# Patient Record
Sex: FEMALE | Race: WHITE | NOT HISPANIC OR LATINO | Employment: FULL TIME | URBAN - NONMETROPOLITAN AREA
[De-identification: names, ages, dates, MRNs, and addresses within clinical notes are randomized per-mention and may not be internally consistent; named-entity substitution may affect disease eponyms.]

---
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
## 2023-01-05 ENCOUNTER — APPOINTMENT (OUTPATIENT)
Dept: ULTRASOUND IMAGING | Facility: HOSPITAL | Age: 23
End: 2023-01-05
Payer: COMMERCIAL

## 2023-01-05 ENCOUNTER — HOSPITAL ENCOUNTER (EMERGENCY)
Facility: HOSPITAL | Age: 23
Discharge: HOME | End: 2023-01-05
Attending: STUDENT IN AN ORGANIZED HEALTH CARE EDUCATION/TRAINING PROGRAM | Admitting: STUDENT IN AN ORGANIZED HEALTH CARE EDUCATION/TRAINING PROGRAM
Payer: COMMERCIAL

## 2023-01-05 VITALS
TEMPERATURE: 98.4 F | HEART RATE: 82 BPM | SYSTOLIC BLOOD PRESSURE: 133 MMHG | DIASTOLIC BLOOD PRESSURE: 63 MMHG | HEIGHT: 63 IN | OXYGEN SATURATION: 99 % | WEIGHT: 132 LBS | BODY MASS INDEX: 23.39 KG/M2 | RESPIRATION RATE: 16 BRPM

## 2023-01-05 DIAGNOSIS — N39.0 URINARY TRACT INFECTION WITH HEMATURIA, SITE UNSPECIFIED: Primary | ICD-10-CM

## 2023-01-05 DIAGNOSIS — R31.9 URINARY TRACT INFECTION WITH HEMATURIA, SITE UNSPECIFIED: Primary | ICD-10-CM

## 2023-01-05 LAB
ALBUMIN SERPL-MCNC: 4.4 G/DL (ref 3.5–5.2)
ALBUMIN/GLOB SERPL: 1.5 G/DL
ALP SERPL-CCNC: 43 U/L (ref 39–117)
ALT SERPL W P-5'-P-CCNC: 19 U/L (ref 1–33)
ANION GAP SERPL CALCULATED.3IONS-SCNC: 10 MMOL/L (ref 5–15)
AST SERPL-CCNC: 18 U/L (ref 1–32)
BACTERIA UR QL AUTO: ABNORMAL /HPF
BASOPHILS # BLD AUTO: 0.06 10*3/MM3 (ref 0–0.2)
BASOPHILS NFR BLD AUTO: 0.4 % (ref 0–1.5)
BILIRUB SERPL-MCNC: 0.5 MG/DL (ref 0–1.2)
BILIRUB UR QL STRIP: NEGATIVE
BUN SERPL-MCNC: 9 MG/DL (ref 6–20)
BUN/CREAT SERPL: 12.3 (ref 7–25)
CALCIUM SPEC-SCNC: 9 MG/DL (ref 8.6–10.5)
CANDIDA ALBICANS: NEGATIVE
CHLORIDE SERPL-SCNC: 103 MMOL/L (ref 98–107)
CLARITY UR: ABNORMAL
CO2 SERPL-SCNC: 24 MMOL/L (ref 22–29)
COLOR UR: YELLOW
CREAT SERPL-MCNC: 0.73 MG/DL (ref 0.57–1)
DEPRECATED RDW RBC AUTO: 40.7 FL (ref 37–54)
EGFRCR SERPLBLD CKD-EPI 2021: 120.2 ML/MIN/1.73
EOSINOPHIL # BLD AUTO: 0.63 10*3/MM3 (ref 0–0.4)
EOSINOPHIL NFR BLD AUTO: 4.4 % (ref 0.3–6.2)
ERYTHROCYTE [DISTWIDTH] IN BLOOD BY AUTOMATED COUNT: 12.6 % (ref 12.3–15.4)
GARDNERELLA VAGINALIS: NEGATIVE
GLOBULIN UR ELPH-MCNC: 3 GM/DL
GLUCOSE SERPL-MCNC: 86 MG/DL (ref 65–99)
GLUCOSE UR STRIP-MCNC: NEGATIVE MG/DL
HCG SERPL QL: NEGATIVE
HCT VFR BLD AUTO: 43.5 % (ref 34–46.6)
HGB BLD-MCNC: 14.4 G/DL (ref 12–15.9)
HGB UR QL STRIP.AUTO: ABNORMAL
HOLD SPECIMEN: NORMAL
IMM GRANULOCYTES # BLD AUTO: 0.05 10*3/MM3 (ref 0–0.05)
IMM GRANULOCYTES NFR BLD AUTO: 0.3 % (ref 0–0.5)
KETONES UR QL STRIP: NEGATIVE
LEUKOCYTE ESTERASE UR QL STRIP.AUTO: ABNORMAL
LIPASE SERPL-CCNC: 21 U/L (ref 13–60)
LYMPHOCYTES # BLD AUTO: 2.55 10*3/MM3 (ref 0.7–3.1)
LYMPHOCYTES NFR BLD AUTO: 17.7 % (ref 19.6–45.3)
MCH RBC QN AUTO: 29.1 PG (ref 26.6–33)
MCHC RBC AUTO-ENTMCNC: 33.1 G/DL (ref 31.5–35.7)
MCV RBC AUTO: 88.1 FL (ref 79–97)
MONOCYTES # BLD AUTO: 0.79 10*3/MM3 (ref 0.1–0.9)
MONOCYTES NFR BLD AUTO: 5.5 % (ref 5–12)
NEUTROPHILS NFR BLD AUTO: 10.35 10*3/MM3 (ref 1.7–7)
NEUTROPHILS NFR BLD AUTO: 71.7 % (ref 42.7–76)
NITRITE UR QL STRIP: NEGATIVE
NRBC BLD AUTO-RTO: 0 /100 WBC (ref 0–0.2)
PH UR STRIP.AUTO: 8 [PH] (ref 5–9)
PLATELET # BLD AUTO: 336 10*3/MM3 (ref 140–450)
PMV BLD AUTO: 9.6 FL (ref 6–12)
POTASSIUM SERPL-SCNC: 4.2 MMOL/L (ref 3.5–5.2)
PROT SERPL-MCNC: 7.4 G/DL (ref 6–8.5)
PROT UR QL STRIP: ABNORMAL
RBC # BLD AUTO: 4.94 10*6/MM3 (ref 3.77–5.28)
RBC # UR STRIP: ABNORMAL /HPF
REF LAB TEST METHOD: ABNORMAL
SODIUM SERPL-SCNC: 137 MMOL/L (ref 136–145)
SP GR UR STRIP: 1.01 (ref 1–1.03)
SQUAMOUS #/AREA URNS HPF: ABNORMAL /HPF
T VAGINALIS DNA VAG QL PROBE+SIG AMP: NEGATIVE
UROBILINOGEN UR QL STRIP: ABNORMAL
WBC # UR STRIP: ABNORMAL /HPF
WBC CLUMPS # UR AUTO: ABNORMAL /HPF
WBC NRBC COR # BLD: 14.43 10*3/MM3 (ref 3.4–10.8)
WHOLE BLOOD HOLD COAG: NORMAL
WHOLE BLOOD HOLD SPECIMEN: NORMAL

## 2023-01-05 PROCEDURE — 87660 TRICHOMONAS VAGIN DIR PROBE: CPT | Performed by: STUDENT IN AN ORGANIZED HEALTH CARE EDUCATION/TRAINING PROGRAM

## 2023-01-05 PROCEDURE — 80053 COMPREHEN METABOLIC PANEL: CPT | Performed by: STUDENT IN AN ORGANIZED HEALTH CARE EDUCATION/TRAINING PROGRAM

## 2023-01-05 PROCEDURE — 87086 URINE CULTURE/COLONY COUNT: CPT | Performed by: STUDENT IN AN ORGANIZED HEALTH CARE EDUCATION/TRAINING PROGRAM

## 2023-01-05 PROCEDURE — 87186 SC STD MICRODIL/AGAR DIL: CPT | Performed by: STUDENT IN AN ORGANIZED HEALTH CARE EDUCATION/TRAINING PROGRAM

## 2023-01-05 PROCEDURE — 87480 CANDIDA DNA DIR PROBE: CPT | Performed by: STUDENT IN AN ORGANIZED HEALTH CARE EDUCATION/TRAINING PROGRAM

## 2023-01-05 PROCEDURE — 87088 URINE BACTERIA CULTURE: CPT | Performed by: STUDENT IN AN ORGANIZED HEALTH CARE EDUCATION/TRAINING PROGRAM

## 2023-01-05 PROCEDURE — 76830 TRANSVAGINAL US NON-OB: CPT

## 2023-01-05 PROCEDURE — 85025 COMPLETE CBC W/AUTO DIFF WBC: CPT | Performed by: STUDENT IN AN ORGANIZED HEALTH CARE EDUCATION/TRAINING PROGRAM

## 2023-01-05 PROCEDURE — 84703 CHORIONIC GONADOTROPIN ASSAY: CPT | Performed by: STUDENT IN AN ORGANIZED HEALTH CARE EDUCATION/TRAINING PROGRAM

## 2023-01-05 PROCEDURE — 99284 EMERGENCY DEPT VISIT MOD MDM: CPT

## 2023-01-05 PROCEDURE — 93976 VASCULAR STUDY: CPT

## 2023-01-05 PROCEDURE — 87510 GARDNER VAG DNA DIR PROBE: CPT | Performed by: STUDENT IN AN ORGANIZED HEALTH CARE EDUCATION/TRAINING PROGRAM

## 2023-01-05 PROCEDURE — 83690 ASSAY OF LIPASE: CPT | Performed by: STUDENT IN AN ORGANIZED HEALTH CARE EDUCATION/TRAINING PROGRAM

## 2023-01-05 PROCEDURE — 81001 URINALYSIS AUTO W/SCOPE: CPT | Performed by: STUDENT IN AN ORGANIZED HEALTH CARE EDUCATION/TRAINING PROGRAM

## 2023-01-05 RX ORDER — CEPHALEXIN 500 MG/1
500 CAPSULE ORAL 2 TIMES DAILY
Qty: 10 CAPSULE | Refills: 0 | Status: SHIPPED | OUTPATIENT
Start: 2023-01-05 | End: 2023-01-10 | Stop reason: HOSPADM

## 2023-01-05 RX ORDER — ACETAMINOPHEN 500 MG
1000 TABLET ORAL ONCE
Status: COMPLETED | OUTPATIENT
Start: 2023-01-05 | End: 2023-01-05

## 2023-01-05 RX ORDER — SODIUM CHLORIDE 0.9 % (FLUSH) 0.9 %
10 SYRINGE (ML) INJECTION AS NEEDED
Status: DISCONTINUED | OUTPATIENT
Start: 2023-01-05 | End: 2023-01-05 | Stop reason: HOSPADM

## 2023-01-05 RX ORDER — METRONIDAZOLE 7.5 MG/G
GEL VAGINAL 2 TIMES DAILY
Qty: 70 G | Refills: 0 | Status: SHIPPED | OUTPATIENT
Start: 2023-01-05 | End: 2023-01-10

## 2023-01-05 RX ADMIN — ACETAMINOPHEN 1000 MG: 500 TABLET ORAL at 15:07

## 2023-01-05 NOTE — ED PROVIDER NOTES
Subjective   History of Present Illness  22-year-old female comes to the ER with a several hour history of right-sided lower pelvic pain with some nausea.  She also endorses some burning with urination.  She had intercourse several days ago, but nothing since then.  Denies vaginal bleeding or trauma.    History provided by:  Patient   used: No        Review of Systems   Constitutional: Negative for chills and fever.   HENT: Negative for drooling.    Eyes: Negative for redness.   Respiratory: Negative for shortness of breath.    Cardiovascular: Negative for chest pain.   Gastrointestinal: Positive for abdominal pain and nausea. Negative for vomiting.   Genitourinary: Positive for dysuria. Negative for flank pain, hematuria, pelvic pain, urgency, vaginal bleeding and vaginal discharge.   Skin: Negative for color change.   Neurological: Negative for seizures.   Psychiatric/Behavioral: Negative for confusion.       Past Medical History:   Diagnosis Date   • Tobacco abuse        No Known Allergies    Past Surgical History:   Procedure Laterality Date   • NO PAST SURGERIES         Family History   Problem Relation Age of Onset   • Hyperlipidemia Paternal Grandfather    • Hypertension Maternal Grandmother        Social History     Socioeconomic History   • Marital status: Single   Tobacco Use   • Smoking status: Some Days     Packs/day: 0.25     Years: 1.00     Pack years: 0.25     Types: Electronic Cigarette, Cigarettes   • Smokeless tobacco: Former   Substance and Sexual Activity   • Alcohol use: No   • Drug use: No   • Sexual activity: Defer           Objective    Vitals:    01/05/23 1234 01/05/23 1345 01/05/23 1400 01/05/23 1554   BP: 112/76 106/55 119/59 133/63   BP Location: Right arm      Patient Position: Sitting      Pulse: 91  89 82   Resp: 16  16 16   Temp: 98.4 °F (36.9 °C)      TempSrc: Oral      SpO2: 99%  98% 99%   Weight: 59.9 kg (132 lb)      Height: 160 cm (63\")          Physical  Exam  Vitals and nursing note reviewed. Exam conducted with a chaperone present.   Constitutional:       General: She is not in acute distress.     Appearance: She is well-developed. She is not ill-appearing or diaphoretic.   HENT:      Head: Normocephalic.      Right Ear: External ear normal.      Left Ear: External ear normal.   Eyes:      Conjunctiva/sclera: Conjunctivae normal.   Pulmonary:      Effort: Pulmonary effort is normal. No accessory muscle usage or respiratory distress.   Abdominal:      Tenderness: There is abdominal tenderness. There is no right CVA tenderness, left CVA tenderness, guarding or rebound.   Genitourinary:     General: Normal vulva.      Exam position: Supine.      Vagina: No signs of injury and foreign body. Vaginal discharge present. No erythema, tenderness or bleeding.      Cervix: Normal.   Skin:     General: Skin is warm and dry.      Capillary Refill: Capillary refill takes less than 2 seconds.   Neurological:      Mental Status: She is oriented to person, place, and time.   Psychiatric:         Behavior: Behavior normal.         Procedures           ED Course      Results for orders placed or performed during the hospital encounter of 01/05/23   Gardnerella vaginalis, Trichomonas vaginalis, Candida albicans, DNA - Swab, Vagina    Specimen: Vagina; Swab   Result Value Ref Range    CANDIDA ALBICANS Negative Negative    GARDNERELLA VAGINALIS Negative Negative    TRICHOMONAS VAGINALIS Negative Negative   Comprehensive Metabolic Panel    Specimen: Blood   Result Value Ref Range    Glucose 86 65 - 99 mg/dL    BUN 9 6 - 20 mg/dL    Creatinine 0.73 0.57 - 1.00 mg/dL    Sodium 137 136 - 145 mmol/L    Potassium 4.2 3.5 - 5.2 mmol/L    Chloride 103 98 - 107 mmol/L    CO2 24.0 22.0 - 29.0 mmol/L    Calcium 9.0 8.6 - 10.5 mg/dL    Total Protein 7.4 6.0 - 8.5 g/dL    Albumin 4.4 3.5 - 5.2 g/dL    ALT (SGPT) 19 1 - 33 U/L    AST (SGOT) 18 1 - 32 U/L    Alkaline Phosphatase 43 39 - 117 U/L     Total Bilirubin 0.5 0.0 - 1.2 mg/dL    Globulin 3.0 gm/dL    A/G Ratio 1.5 g/dL    BUN/Creatinine Ratio 12.3 7.0 - 25.0    Anion Gap 10.0 5.0 - 15.0 mmol/L    eGFR 120.2 >60.0 mL/min/1.73   Lipase    Specimen: Blood   Result Value Ref Range    Lipase 21 13 - 60 U/L   CBC Auto Differential    Specimen: Blood   Result Value Ref Range    WBC 14.43 (H) 3.40 - 10.80 10*3/mm3    RBC 4.94 3.77 - 5.28 10*6/mm3    Hemoglobin 14.4 12.0 - 15.9 g/dL    Hematocrit 43.5 34.0 - 46.6 %    MCV 88.1 79.0 - 97.0 fL    MCH 29.1 26.6 - 33.0 pg    MCHC 33.1 31.5 - 35.7 g/dL    RDW 12.6 12.3 - 15.4 %    RDW-SD 40.7 37.0 - 54.0 fl    MPV 9.6 6.0 - 12.0 fL    Platelets 336 140 - 450 10*3/mm3    Neutrophil % 71.7 42.7 - 76.0 %    Lymphocyte % 17.7 (L) 19.6 - 45.3 %    Monocyte % 5.5 5.0 - 12.0 %    Eosinophil % 4.4 0.3 - 6.2 %    Basophil % 0.4 0.0 - 1.5 %    Immature Grans % 0.3 0.0 - 0.5 %    Neutrophils, Absolute 10.35 (H) 1.70 - 7.00 10*3/mm3    Lymphocytes, Absolute 2.55 0.70 - 3.10 10*3/mm3    Monocytes, Absolute 0.79 0.10 - 0.90 10*3/mm3    Eosinophils, Absolute 0.63 (H) 0.00 - 0.40 10*3/mm3    Basophils, Absolute 0.06 0.00 - 0.20 10*3/mm3    Immature Grans, Absolute 0.05 0.00 - 0.05 10*3/mm3    nRBC 0.0 0.0 - 0.2 /100 WBC   hCG, Serum, Qualitative    Specimen: Blood   Result Value Ref Range    HCG Qualitative Negative Negative   Urinalysis With Culture If Indicated - Urine, Clean Catch    Specimen: Urine, Clean Catch   Result Value Ref Range    Color, UA Yellow Yellow, Straw, Dark Yellow, Shannan    Appearance, UA Cloudy (A) Clear    pH, UA 8.0 5.0 - 9.0    Specific [place], UA 1.011 1.003 - 1.030    Glucose, UA Negative Negative    Ketones, UA Negative Negative    Bilirubin, UA Negative Negative    Blood, UA Small (1+) (A) Negative    Protein, UA 30 mg/dL (1+) (A) Negative    Leuk Esterase, UA Large (3+) (A) Negative    Nitrite, UA Negative Negative    Urobilinogen, UA 0.2 E.U./dL 0.2 - 1.0 E.U./dL   Urinalysis, Microscopic Only -  Urine, Clean Catch    Specimen: Urine, Clean Catch   Result Value Ref Range    RBC, UA 3-5 (A) None Seen /HPF    WBC, UA Too Numerous to Count (A) None Seen, 0-2, 3-5 /HPF    Bacteria, UA Trace (A) None Seen /HPF    Squamous Epithelial Cells, UA 0-2 None Seen, 0-2 /HPF    WBC Clumps, UA Moderate/2+ None Seen /HPF    Methodology Manual Light Microscopy    Green Top (Gel)   Result Value Ref Range    Extra Tube Hold for add-ons.    Lavender Top   Result Value Ref Range    Extra Tube hold for add-on    Light Blue Top   Result Value Ref Range    Extra Tube Hold for add-ons.                                         Medical Decision Making  Vital signs are stable, afebrile.  Labs obtained significant for 3+ leukocytes.  White count is normal.  Vaginalis panel is negative.  Ultrasound shows no acute findings.  Good blood flow bilaterally.  Antibiotics called into her pharmacy.  Return precautions given.  Recommend PCP follow-up.  Patient states understanding and is agreeable to the plan.    Urinary tract infection with hematuria, site unspecified: acute illness or injury  Amount and/or Complexity of Data Reviewed  Labs: ordered. Decision-making details documented in ED Course.  Radiology: ordered. Decision-making details documented in ED Course.  ECG/medicine tests: ordered.      Risk  OTC drugs.  Prescription drug management.          Final diagnoses:   Urinary tract infection with hematuria, site unspecified       ED Disposition  ED Disposition     ED Disposition   Discharge    Condition   Stable    Comment   --             Vijaya Guzman MD  [address] [place], [place] [id number] [phone]    Schedule an appointment as soon as possible for a visit in 2 days           Medication List      New Prescriptions    cephalexin 500 MG capsule  Commonly known as: KEFLEX  Take 1 capsule by mouth 2 (Two) Times a Day for 5 days.     metroNIDAZOLE 0.75 % vaginal gel  Commonly known as: METROGEL  Insert  into the vagina 2 (Two)  Times a Day for 5 days.           Where to Get Your Medications      These medications were sent to [id number] - [facility] [address] AT [facility] [address] - [phone]  - [phone]   [address] [address]    Phone: [phone]   · cephalexin 500 MG capsule  · metroNIDAZOLE 0.75 % vaginal gel          Martin Reynaga MD  01/05/23 9492

## 2023-01-06 ENCOUNTER — HOSPITAL ENCOUNTER (INPATIENT)
Facility: HOSPITAL | Age: 23
LOS: 3 days | Discharge: HOME | End: 2023-01-10
Attending: EMERGENCY MEDICINE | Admitting: HOSPITALIST
Payer: COMMERCIAL

## 2023-01-06 ENCOUNTER — APPOINTMENT (OUTPATIENT)
Dept: CT IMAGING | Facility: HOSPITAL | Age: 23
End: 2023-01-06
Payer: COMMERCIAL

## 2023-01-06 DIAGNOSIS — N12 PYELONEPHRITIS: ICD-10-CM

## 2023-01-06 DIAGNOSIS — A41.9 SEPSIS WITHOUT ACUTE ORGAN DYSFUNCTION, DUE TO UNSPECIFIED ORGANISM: Primary | ICD-10-CM

## 2023-01-06 DIAGNOSIS — N20.1 URETEROLITHIASIS: ICD-10-CM

## 2023-01-06 LAB
ALBUMIN SERPL-MCNC: 4.2 G/DL (ref 3.5–5.2)
ALBUMIN/GLOB SERPL: 1.2 G/DL
ALP SERPL-CCNC: 48 U/L (ref 39–117)
ALT SERPL W P-5'-P-CCNC: 23 U/L (ref 1–33)
ANION GAP SERPL CALCULATED.3IONS-SCNC: 14 MMOL/L (ref 5–15)
AST SERPL-CCNC: 19 U/L (ref 1–32)
BACTERIA UR QL AUTO: ABNORMAL /HPF
BASOPHILS # BLD AUTO: 0.06 10*3/MM3 (ref 0–0.2)
BASOPHILS NFR BLD AUTO: 0.4 % (ref 0–1.5)
BILIRUB SERPL-MCNC: 0.5 MG/DL (ref 0–1.2)
BILIRUB UR QL STRIP: NEGATIVE
BUN SERPL-MCNC: 11 MG/DL (ref 6–20)
BUN/CREAT SERPL: 12.1 (ref 7–25)
CALCIUM SPEC-SCNC: 9 MG/DL (ref 8.6–10.5)
CHLORIDE SERPL-SCNC: 96 MMOL/L (ref 98–107)
CLARITY UR: CLEAR
CO2 SERPL-SCNC: 22 MMOL/L (ref 22–29)
COLOR UR: YELLOW
CREAT SERPL-MCNC: 0.91 MG/DL (ref 0.57–1)
D-LACTATE SERPL-SCNC: 0.9 MMOL/L (ref 0.5–2)
DEPRECATED RDW RBC AUTO: 38.7 FL (ref 37–54)
EGFRCR SERPLBLD CKD-EPI 2021: 92.2 ML/MIN/1.73
EOSINOPHIL # BLD AUTO: 0.01 10*3/MM3 (ref 0–0.4)
EOSINOPHIL NFR BLD AUTO: 0.1 % (ref 0.3–6.2)
ERYTHROCYTE [DISTWIDTH] IN BLOOD BY AUTOMATED COUNT: 12.6 % (ref 12.3–15.4)
GLOBULIN UR ELPH-MCNC: 3.5 GM/DL
GLUCOSE SERPL-MCNC: 96 MG/DL (ref 65–99)
GLUCOSE UR STRIP-MCNC: NEGATIVE MG/DL
HCG SERPL QL: NEGATIVE
HCT VFR BLD AUTO: 39.5 % (ref 34–46.6)
HGB BLD-MCNC: 13.9 G/DL (ref 12–15.9)
HGB UR QL STRIP.AUTO: ABNORMAL
HOLD SPECIMEN: NORMAL
HYALINE CASTS UR QL AUTO: ABNORMAL /LPF
IMM GRANULOCYTES # BLD AUTO: 0.04 10*3/MM3 (ref 0–0.05)
IMM GRANULOCYTES NFR BLD AUTO: 0.3 % (ref 0–0.5)
KETONES UR QL STRIP: ABNORMAL
LEUKOCYTE ESTERASE UR QL STRIP.AUTO: ABNORMAL
LYMPHOCYTES # BLD AUTO: 1.15 10*3/MM3 (ref 0.7–3.1)
LYMPHOCYTES NFR BLD AUTO: 7.9 % (ref 19.6–45.3)
MAGNESIUM SERPL-MCNC: 1.8 MG/DL (ref 1.6–2.6)
MCH RBC QN AUTO: 30.1 PG (ref 26.6–33)
MCHC RBC AUTO-ENTMCNC: 35.2 G/DL (ref 31.5–35.7)
MCV RBC AUTO: 85.5 FL (ref 79–97)
MONOCYTES # BLD AUTO: 0.97 10*3/MM3 (ref 0.1–0.9)
MONOCYTES NFR BLD AUTO: 6.6 % (ref 5–12)
NEUTROPHILS NFR BLD AUTO: 12.36 10*3/MM3 (ref 1.7–7)
NEUTROPHILS NFR BLD AUTO: 84.7 % (ref 42.7–76)
NITRITE UR QL STRIP: NEGATIVE
NRBC BLD AUTO-RTO: 0 /100 WBC (ref 0–0.2)
PH UR STRIP.AUTO: 6 [PH] (ref 5–9)
PLATELET # BLD AUTO: 274 10*3/MM3 (ref 140–450)
PMV BLD AUTO: 9.5 FL (ref 6–12)
POTASSIUM SERPL-SCNC: 3.8 MMOL/L (ref 3.5–5.2)
PROT SERPL-MCNC: 7.7 G/DL (ref 6–8.5)
PROT UR QL STRIP: NEGATIVE
RBC # BLD AUTO: 4.62 10*6/MM3 (ref 3.77–5.28)
RBC # UR STRIP: ABNORMAL /HPF
REF LAB TEST METHOD: ABNORMAL
SODIUM SERPL-SCNC: 132 MMOL/L (ref 136–145)
SP GR UR STRIP: 1 (ref 1–1.03)
SQUAMOUS #/AREA URNS HPF: ABNORMAL /HPF
UROBILINOGEN UR QL STRIP: ABNORMAL
WBC # UR STRIP: ABNORMAL /HPF
WBC NRBC COR # BLD: 14.59 10*3/MM3 (ref 3.4–10.8)
WHOLE BLOOD HOLD COAG: NORMAL

## 2023-01-06 PROCEDURE — 87040 BLOOD CULTURE FOR BACTERIA: CPT | Performed by: EMERGENCY MEDICINE

## 2023-01-06 PROCEDURE — 93010 ELECTROCARDIOGRAM REPORT: CPT | Performed by: INTERNAL MEDICINE

## 2023-01-06 PROCEDURE — 83605 ASSAY OF LACTIC ACID: CPT | Performed by: EMERGENCY MEDICINE

## 2023-01-06 PROCEDURE — 87086 URINE CULTURE/COLONY COUNT: CPT | Performed by: INTERNAL MEDICINE

## 2023-01-06 PROCEDURE — 25010000002 ONDANSETRON PER 1 MG: Performed by: EMERGENCY MEDICINE

## 2023-01-06 PROCEDURE — 87491 CHLMYD TRACH DNA AMP PROBE: CPT | Performed by: EMERGENCY MEDICINE

## 2023-01-06 PROCEDURE — 25010000002 CEFTRIAXONE PER 250 MG: Performed by: EMERGENCY MEDICINE

## 2023-01-06 PROCEDURE — 87591 N.GONORRHOEAE DNA AMP PROB: CPT | Performed by: EMERGENCY MEDICINE

## 2023-01-06 PROCEDURE — 25010000002 KETOROLAC TROMETHAMINE PER 15 MG: Performed by: EMERGENCY MEDICINE

## 2023-01-06 PROCEDURE — 99285 EMERGENCY DEPT VISIT HI MDM: CPT

## 2023-01-06 PROCEDURE — 93005 ELECTROCARDIOGRAM TRACING: CPT | Performed by: EMERGENCY MEDICINE

## 2023-01-06 PROCEDURE — 85025 COMPLETE CBC W/AUTO DIFF WBC: CPT | Performed by: EMERGENCY MEDICINE

## 2023-01-06 PROCEDURE — 87661 TRICHOMONAS VAGINALIS AMPLIF: CPT | Performed by: EMERGENCY MEDICINE

## 2023-01-06 PROCEDURE — 83735 ASSAY OF MAGNESIUM: CPT | Performed by: EMERGENCY MEDICINE

## 2023-01-06 PROCEDURE — 93005 ELECTROCARDIOGRAM TRACING: CPT

## 2023-01-06 PROCEDURE — 84703 CHORIONIC GONADOTROPIN ASSAY: CPT | Performed by: EMERGENCY MEDICINE

## 2023-01-06 PROCEDURE — 74176 CT ABD & PELVIS W/O CONTRAST: CPT

## 2023-01-06 PROCEDURE — 81001 URINALYSIS AUTO W/SCOPE: CPT | Performed by: EMERGENCY MEDICINE

## 2023-01-06 PROCEDURE — 80053 COMPREHEN METABOLIC PANEL: CPT | Performed by: EMERGENCY MEDICINE

## 2023-01-06 RX ORDER — ONDANSETRON 2 MG/ML
4 INJECTION INTRAMUSCULAR; INTRAVENOUS ONCE
Status: COMPLETED | OUTPATIENT
Start: 2023-01-06 | End: 2023-01-06

## 2023-01-06 RX ORDER — SODIUM CHLORIDE 0.9 % (FLUSH) 0.9 %
10 SYRINGE (ML) INJECTION AS NEEDED
Status: DISCONTINUED | OUTPATIENT
Start: 2023-01-06 | End: 2023-01-10 | Stop reason: HOSPADM

## 2023-01-06 RX ORDER — KETOROLAC TROMETHAMINE 30 MG/ML
30 INJECTION, SOLUTION INTRAMUSCULAR; INTRAVENOUS ONCE
Status: COMPLETED | OUTPATIENT
Start: 2023-01-06 | End: 2023-01-06

## 2023-01-06 RX ADMIN — SODIUM CHLORIDE 1000 ML: 9 INJECTION, SOLUTION INTRAVENOUS at 20:44

## 2023-01-06 RX ADMIN — CEFTRIAXONE SODIUM 1 G: 1 INJECTION, POWDER, FOR SOLUTION INTRAMUSCULAR; INTRAVENOUS at 21:45

## 2023-01-06 RX ADMIN — KETOROLAC TROMETHAMINE 30 MG: 30 INJECTION, SOLUTION INTRAMUSCULAR; INTRAVENOUS at 20:44

## 2023-01-06 RX ADMIN — ONDANSETRON 4 MG: 2 INJECTION INTRAMUSCULAR; INTRAVENOUS at 20:45

## 2023-01-07 ENCOUNTER — ANESTHESIA EVENT (OUTPATIENT)
Dept: PERIOP | Facility: HOSPITAL | Age: 23
End: 2023-01-07
Payer: COMMERCIAL

## 2023-01-07 ENCOUNTER — APPOINTMENT (OUTPATIENT)
Dept: GENERAL RADIOLOGY | Facility: HOSPITAL | Age: 23
End: 2023-01-07
Payer: COMMERCIAL

## 2023-01-07 ENCOUNTER — ANESTHESIA (OUTPATIENT)
Dept: PERIOP | Facility: HOSPITAL | Age: 23
End: 2023-01-07
Payer: COMMERCIAL

## 2023-01-07 PROBLEM — N20.1 URETEROLITHIASIS: Status: ACTIVE | Noted: 2023-01-06

## 2023-01-07 PROBLEM — A41.9 SEPSIS WITHOUT ACUTE ORGAN DYSFUNCTION, DUE TO UNSPECIFIED ORGANISM: Status: ACTIVE | Noted: 2023-01-07

## 2023-01-07 PROBLEM — A41.9 SEPSIS: Status: ACTIVE | Noted: 2023-01-07

## 2023-01-07 LAB
ANION GAP SERPL CALCULATED.3IONS-SCNC: 10 MMOL/L (ref 5–15)
BACTERIA SPEC AEROBE CULT: ABNORMAL
BUN SERPL-MCNC: 11 MG/DL (ref 6–20)
BUN/CREAT SERPL: 13.8 (ref 7–25)
C TRACH RRNA CVX QL NAA+PROBE: NEGATIVE
CALCIUM SPEC-SCNC: 7.3 MG/DL (ref 8.6–10.5)
CHLORIDE SERPL-SCNC: 105 MMOL/L (ref 98–107)
CO2 SERPL-SCNC: 20 MMOL/L (ref 22–29)
CREAT SERPL-MCNC: 0.8 MG/DL (ref 0.57–1)
DEPRECATED RDW RBC AUTO: 40.4 FL (ref 37–54)
EGFRCR SERPLBLD CKD-EPI 2021: 107.7 ML/MIN/1.73
ERYTHROCYTE [DISTWIDTH] IN BLOOD BY AUTOMATED COUNT: 12.4 % (ref 12.3–15.4)
GLUCOSE SERPL-MCNC: 95 MG/DL (ref 65–99)
HBA1C MFR BLD: 4.8 % (ref 4.8–5.6)
HCT VFR BLD AUTO: 35.7 % (ref 34–46.6)
HGB BLD-MCNC: 11.9 G/DL (ref 12–15.9)
MAGNESIUM SERPL-MCNC: 1.7 MG/DL (ref 1.6–2.6)
MCH RBC QN AUTO: 29.7 PG (ref 26.6–33)
MCHC RBC AUTO-ENTMCNC: 33.3 G/DL (ref 31.5–35.7)
MCV RBC AUTO: 89 FL (ref 79–97)
N GONORRHOEA RRNA SPEC QL NAA+PROBE: NEGATIVE
PLATELET # BLD AUTO: 238 10*3/MM3 (ref 140–450)
PMV BLD AUTO: 9.6 FL (ref 6–12)
POTASSIUM SERPL-SCNC: 4 MMOL/L (ref 3.5–5.2)
QT INTERVAL: 256 MS
QTC INTERVAL: 395 MS
RBC # BLD AUTO: 4.01 10*6/MM3 (ref 3.77–5.28)
SODIUM SERPL-SCNC: 135 MMOL/L (ref 136–145)
TRICHOMONAS VAGINALIS PCR: NEGATIVE
TSH SERPL DL<=0.05 MIU/L-ACNC: 1.32 UIU/ML (ref 0.27–4.2)
WBC NRBC COR # BLD: 15.71 10*3/MM3 (ref 3.4–10.8)

## 2023-01-07 PROCEDURE — 25010000002 ONDANSETRON PER 1 MG: Performed by: INTERNAL MEDICINE

## 2023-01-07 PROCEDURE — 83036 HEMOGLOBIN GLYCOSYLATED A1C: CPT | Performed by: INTERNAL MEDICINE

## 2023-01-07 PROCEDURE — C1769 GUIDE WIRE: HCPCS | Performed by: UROLOGY

## 2023-01-07 PROCEDURE — 25010000002 CEFAZOLIN PER 500 MG: Performed by: NURSE ANESTHETIST, CERTIFIED REGISTERED

## 2023-01-07 PROCEDURE — 25010000002 FENTANYL CITRATE (PF) 50 MCG/ML SOLUTION: Performed by: NURSE ANESTHETIST, CERTIFIED REGISTERED

## 2023-01-07 PROCEDURE — 25010000002 MORPHINE PER 10 MG: Performed by: INTERNAL MEDICINE

## 2023-01-07 PROCEDURE — 85027 COMPLETE CBC AUTOMATED: CPT | Performed by: INTERNAL MEDICINE

## 2023-01-07 PROCEDURE — 25010000002 IOPAMIDOL 61 % SOLUTION: Performed by: UROLOGY

## 2023-01-07 PROCEDURE — 83735 ASSAY OF MAGNESIUM: CPT | Performed by: INTERNAL MEDICINE

## 2023-01-07 PROCEDURE — C1758 CATHETER, URETERAL: HCPCS | Performed by: UROLOGY

## 2023-01-07 PROCEDURE — 80048 BASIC METABOLIC PNL TOTAL CA: CPT | Performed by: INTERNAL MEDICINE

## 2023-01-07 PROCEDURE — 25010000002 PIPERACILLIN SOD-TAZOBACTAM PER 1 G: Performed by: INTERNAL MEDICINE

## 2023-01-07 PROCEDURE — 84443 ASSAY THYROID STIM HORMONE: CPT | Performed by: INTERNAL MEDICINE

## 2023-01-07 PROCEDURE — 0T768DZ DILATION OF RIGHT URETER WITH INTRALUMINAL DEVICE, VIA NATURAL OR ARTIFICIAL OPENING ENDOSCOPIC: ICD-10-PCS | Performed by: UROLOGY

## 2023-01-07 PROCEDURE — 25010000002 MIDAZOLAM PER 1 MG: Performed by: NURSE ANESTHETIST, CERTIFIED REGISTERED

## 2023-01-07 PROCEDURE — C1894 INTRO/SHEATH, NON-LASER: HCPCS | Performed by: UROLOGY

## 2023-01-07 PROCEDURE — C2617 STENT, NON-COR, TEM W/O DEL: HCPCS | Performed by: UROLOGY

## 2023-01-07 PROCEDURE — 25010000002 PROPOFOL 10 MG/ML EMULSION: Performed by: NURSE ANESTHETIST, CERTIFIED REGISTERED

## 2023-01-07 PROCEDURE — 74420 UROGRAPHY RTRGR +-KUB: CPT

## 2023-01-07 DEVICE — URETERAL STENT
Type: IMPLANTABLE DEVICE | Site: URETER | Status: FUNCTIONAL
Brand: POLARIS™ ULTRA

## 2023-01-07 RX ORDER — CEFAZOLIN SODIUM 1 G/3ML
INJECTION, POWDER, FOR SOLUTION INTRAMUSCULAR; INTRAVENOUS AS NEEDED
Status: DISCONTINUED | OUTPATIENT
Start: 2023-01-07 | End: 2023-01-07 | Stop reason: SURG

## 2023-01-07 RX ORDER — MORPHINE SULFATE 2 MG/ML
2 INJECTION, SOLUTION INTRAMUSCULAR; INTRAVENOUS EVERY 4 HOURS PRN
Status: DISCONTINUED | OUTPATIENT
Start: 2023-01-07 | End: 2023-01-07

## 2023-01-07 RX ORDER — FENTANYL CITRATE 50 UG/ML
INJECTION, SOLUTION INTRAMUSCULAR; INTRAVENOUS AS NEEDED
Status: DISCONTINUED | OUTPATIENT
Start: 2023-01-07 | End: 2023-01-07 | Stop reason: SURG

## 2023-01-07 RX ORDER — PROPOFOL 10 MG/ML
VIAL (ML) INTRAVENOUS AS NEEDED
Status: DISCONTINUED | OUTPATIENT
Start: 2023-01-07 | End: 2023-01-07 | Stop reason: SURG

## 2023-01-07 RX ORDER — ONDANSETRON 4 MG/1
4 TABLET, FILM COATED ORAL EVERY 6 HOURS PRN
Status: DISCONTINUED | OUTPATIENT
Start: 2023-01-07 | End: 2023-01-10 | Stop reason: HOSPADM

## 2023-01-07 RX ORDER — KETOROLAC TROMETHAMINE 15 MG/ML
15 INJECTION, SOLUTION INTRAMUSCULAR; INTRAVENOUS EVERY 6 HOURS PRN
Status: DISCONTINUED | OUTPATIENT
Start: 2023-01-07 | End: 2023-01-07

## 2023-01-07 RX ORDER — TAMSULOSIN HYDROCHLORIDE 0.4 MG/1
0.4 CAPSULE ORAL DAILY
Status: DISCONTINUED | OUTPATIENT
Start: 2023-01-08 | End: 2023-01-10 | Stop reason: HOSPADM

## 2023-01-07 RX ORDER — MIDAZOLAM HYDROCHLORIDE 1 MG/ML
INJECTION INTRAMUSCULAR; INTRAVENOUS AS NEEDED
Status: DISCONTINUED | OUTPATIENT
Start: 2023-01-07 | End: 2023-01-07 | Stop reason: SURG

## 2023-01-07 RX ORDER — ACETAMINOPHEN 325 MG/1
650 TABLET ORAL EVERY 6 HOURS PRN
Status: DISCONTINUED | OUTPATIENT
Start: 2023-01-07 | End: 2023-01-10 | Stop reason: HOSPADM

## 2023-01-07 RX ORDER — ACETAMINOPHEN 325 MG/1
650 TABLET ORAL EVERY 6 HOURS PRN
Status: DISCONTINUED | OUTPATIENT
Start: 2023-01-07 | End: 2023-01-07

## 2023-01-07 RX ORDER — DEXTROSE AND SODIUM CHLORIDE 5; .45 G/100ML; G/100ML
100 INJECTION, SOLUTION INTRAVENOUS CONTINUOUS
Status: DISCONTINUED | OUTPATIENT
Start: 2023-01-07 | End: 2023-01-09

## 2023-01-07 RX ORDER — HYDROCODONE BITARTRATE AND ACETAMINOPHEN 5; 325 MG/1; MG/1
1 TABLET ORAL EVERY 6 HOURS PRN
Status: DISCONTINUED | OUTPATIENT
Start: 2023-01-07 | End: 2023-01-10 | Stop reason: HOSPADM

## 2023-01-07 RX ORDER — SODIUM CHLORIDE 0.9 % (FLUSH) 0.9 %
10 SYRINGE (ML) INJECTION AS NEEDED
Status: DISCONTINUED | OUTPATIENT
Start: 2023-01-07 | End: 2023-01-10 | Stop reason: HOSPADM

## 2023-01-07 RX ORDER — ONDANSETRON 2 MG/ML
4 INJECTION INTRAMUSCULAR; INTRAVENOUS EVERY 6 HOURS PRN
Status: DISCONTINUED | OUTPATIENT
Start: 2023-01-07 | End: 2023-01-07

## 2023-01-07 RX ORDER — SODIUM CHLORIDE 9 MG/ML
40 INJECTION, SOLUTION INTRAVENOUS AS NEEDED
Status: DISCONTINUED | OUTPATIENT
Start: 2023-01-07 | End: 2023-01-10 | Stop reason: HOSPADM

## 2023-01-07 RX ORDER — NALOXONE HCL 0.4 MG/ML
0.4 VIAL (ML) INJECTION
Status: DISCONTINUED | OUTPATIENT
Start: 2023-01-07 | End: 2023-01-10 | Stop reason: HOSPADM

## 2023-01-07 RX ORDER — ONDANSETRON 2 MG/ML
4 INJECTION INTRAMUSCULAR; INTRAVENOUS EVERY 6 HOURS PRN
Status: DISCONTINUED | OUTPATIENT
Start: 2023-01-07 | End: 2023-01-10 | Stop reason: HOSPADM

## 2023-01-07 RX ORDER — SODIUM CHLORIDE 0.9 % (FLUSH) 0.9 %
10 SYRINGE (ML) INJECTION EVERY 12 HOURS SCHEDULED
Status: DISCONTINUED | OUTPATIENT
Start: 2023-01-07 | End: 2023-01-10 | Stop reason: HOSPADM

## 2023-01-07 RX ORDER — HYDROCODONE BITARTRATE AND ACETAMINOPHEN 5; 325 MG/1; MG/1
1 TABLET ORAL EVERY 6 HOURS PRN
Status: DISCONTINUED | OUTPATIENT
Start: 2023-01-07 | End: 2023-01-07

## 2023-01-07 RX ORDER — KETOROLAC TROMETHAMINE 30 MG/ML
15 INJECTION, SOLUTION INTRAMUSCULAR; INTRAVENOUS EVERY 6 HOURS PRN
Status: DISCONTINUED | OUTPATIENT
Start: 2023-01-08 | End: 2023-01-10 | Stop reason: HOSPADM

## 2023-01-07 RX ORDER — LIDOCAINE HYDROCHLORIDE 20 MG/ML
JELLY TOPICAL AS NEEDED
Status: DISCONTINUED | OUTPATIENT
Start: 2023-01-07 | End: 2023-01-07 | Stop reason: HOSPADM

## 2023-01-07 RX ORDER — SODIUM CHLORIDE, SODIUM LACTATE, POTASSIUM CHLORIDE, CALCIUM CHLORIDE 600; 310; 30; 20 MG/100ML; MG/100ML; MG/100ML; MG/100ML
125 INJECTION, SOLUTION INTRAVENOUS CONTINUOUS
Status: DISCONTINUED | OUTPATIENT
Start: 2023-01-07 | End: 2023-01-09

## 2023-01-07 RX ORDER — TAMSULOSIN HYDROCHLORIDE 0.4 MG/1
0.4 CAPSULE ORAL ONCE
Status: COMPLETED | OUTPATIENT
Start: 2023-01-07 | End: 2023-01-07

## 2023-01-07 RX ADMIN — Medication 10 ML: at 02:09

## 2023-01-07 RX ADMIN — MIDAZOLAM 2 MG: 1 INJECTION, SOLUTION INTRAMUSCULAR; INTRAVENOUS at 16:11

## 2023-01-07 RX ADMIN — PROPOFOL 50 MG: 10 INJECTION, EMULSION INTRAVENOUS at 16:31

## 2023-01-07 RX ADMIN — SODIUM CHLORIDE, POTASSIUM CHLORIDE, SODIUM LACTATE AND CALCIUM CHLORIDE 125 ML/HR: 600; 310; 30; 20 INJECTION, SOLUTION INTRAVENOUS at 02:02

## 2023-01-07 RX ADMIN — PROPOFOL 10 MG: 10 INJECTION, EMULSION INTRAVENOUS at 16:33

## 2023-01-07 RX ADMIN — PROPOFOL 50 MG: 10 INJECTION, EMULSION INTRAVENOUS at 16:37

## 2023-01-07 RX ADMIN — FENTANYL CITRATE 25 MCG: 50 INJECTION, SOLUTION INTRAMUSCULAR; INTRAVENOUS at 16:23

## 2023-01-07 RX ADMIN — MORPHINE SULFATE 2 MG: 2 INJECTION, SOLUTION INTRAMUSCULAR; INTRAVENOUS at 14:52

## 2023-01-07 RX ADMIN — PROPOFOL 50 MG: 10 INJECTION, EMULSION INTRAVENOUS at 16:32

## 2023-01-07 RX ADMIN — ACETAMINOPHEN 650 MG: 325 TABLET ORAL at 06:21

## 2023-01-07 RX ADMIN — PROPOFOL 40 MG: 10 INJECTION, EMULSION INTRAVENOUS at 16:36

## 2023-01-07 RX ADMIN — MORPHINE SULFATE 2 MG: 2 INJECTION, SOLUTION INTRAMUSCULAR; INTRAVENOUS at 09:51

## 2023-01-07 RX ADMIN — PROPOFOL 50 MG: 10 INJECTION, EMULSION INTRAVENOUS at 16:40

## 2023-01-07 RX ADMIN — PROPOFOL 100 MG: 10 INJECTION, EMULSION INTRAVENOUS at 16:18

## 2023-01-07 RX ADMIN — PROPOFOL 10 MG: 10 INJECTION, EMULSION INTRAVENOUS at 16:39

## 2023-01-07 RX ADMIN — PROPOFOL 20 MG: 10 INJECTION, EMULSION INTRAVENOUS at 16:26

## 2023-01-07 RX ADMIN — ONDANSETRON 4 MG: 2 INJECTION INTRAMUSCULAR; INTRAVENOUS at 09:51

## 2023-01-07 RX ADMIN — PROPOFOL 30 MG: 10 INJECTION, EMULSION INTRAVENOUS at 16:21

## 2023-01-07 RX ADMIN — MORPHINE SULFATE 2 MG: 2 INJECTION, SOLUTION INTRAMUSCULAR; INTRAVENOUS at 02:02

## 2023-01-07 RX ADMIN — TAMSULOSIN HYDROCHLORIDE 0.4 MG: 0.4 CAPSULE ORAL at 00:16

## 2023-01-07 RX ADMIN — SODIUM CHLORIDE 1000 ML: 9 INJECTION, SOLUTION INTRAVENOUS at 00:16

## 2023-01-07 RX ADMIN — CEFAZOLIN SODIUM 1 G: 1 INJECTION, POWDER, FOR SOLUTION INTRAMUSCULAR; INTRAVENOUS at 16:27

## 2023-01-07 RX ADMIN — FENTANYL CITRATE 25 MCG: 50 INJECTION, SOLUTION INTRAMUSCULAR; INTRAVENOUS at 16:18

## 2023-01-07 RX ADMIN — PROPOFOL 50 MG: 10 INJECTION, EMULSION INTRAVENOUS at 16:29

## 2023-01-07 RX ADMIN — PIPERACILLIN SODIUM AND TAZOBACTAM SODIUM 3.38 G: 3; .375 INJECTION, POWDER, LYOPHILIZED, FOR SOLUTION INTRAVENOUS at 18:11

## 2023-01-07 RX ADMIN — PROPOFOL 50 MG: 10 INJECTION, EMULSION INTRAVENOUS at 16:27

## 2023-01-07 RX ADMIN — PIPERACILLIN SODIUM AND TAZOBACTAM SODIUM 3.38 G: 3; .375 INJECTION, POWDER, LYOPHILIZED, FOR SOLUTION INTRAVENOUS at 09:43

## 2023-01-07 RX ADMIN — PROPOFOL 50 MG: 10 INJECTION, EMULSION INTRAVENOUS at 16:35

## 2023-01-07 RX ADMIN — FENTANYL CITRATE 50 MCG: 50 INJECTION, SOLUTION INTRAMUSCULAR; INTRAVENOUS at 16:39

## 2023-01-07 RX ADMIN — SODIUM CHLORIDE, POTASSIUM CHLORIDE, SODIUM LACTATE AND CALCIUM CHLORIDE 125 ML/HR: 600; 310; 30; 20 INJECTION, SOLUTION INTRAVENOUS at 14:58

## 2023-01-07 RX ADMIN — DEXTROSE AND SODIUM CHLORIDE 100 ML/HR: 5; 450 INJECTION, SOLUTION INTRAVENOUS at 18:11

## 2023-01-07 RX ADMIN — ONDANSETRON 4 MG: 2 INJECTION INTRAMUSCULAR; INTRAVENOUS at 02:03

## 2023-01-07 RX ADMIN — PROPOFOL 50 MG: 10 INJECTION, EMULSION INTRAVENOUS at 16:24

## 2023-01-07 RX ADMIN — PROPOFOL 50 MG: 10 INJECTION, EMULSION INTRAVENOUS at 16:38

## 2023-01-07 RX ADMIN — HYDROCODONE BITARTRATE AND ACETAMINOPHEN 1 TABLET: 5; 325 TABLET ORAL at 19:47

## 2023-01-07 RX ADMIN — PIPERACILLIN SODIUM AND TAZOBACTAM SODIUM 3.38 G: 3; .375 INJECTION, POWDER, LYOPHILIZED, FOR SOLUTION INTRAVENOUS at 02:09

## 2023-01-07 RX ADMIN — SODIUM CHLORIDE 1000 ML: 9 INJECTION, SOLUTION INTRAVENOUS at 02:02

## 2023-01-07 NOTE — PLAN OF CARE
Goal Outcome Evaluation:  Plan of Care Reviewed With: patient        Progress: improving  Outcome Evaluation: new admission

## 2023-01-07 NOTE — OP NOTE
CYSTOSCOPY URETEROSCOPY RETROGRADE PYELOGRAM HOLMIUM LASER STENT INSERTION  Procedure Note    Yasmeen Sandoval  1/7/2023    Pre-op Diagnosis:   Ureterolithiasis [N20.1]    Post-op Diagnosis:     Post-Op Diagnosis Codes:     * Ureterolithiasis [N20.1]    Procedure(s):  CYSTOSCOPY RIGHT URETEROSCOPY RETROGRADE PYELOGRAM HOLMIUM STENT INSERTION    Surgeon(s):  Lawrence Gutierrez MD    Anesthesia: Choice    Staff:   Circulator: Nemo Broussard RN  Radiology Technologist: Trinidad Drew  Scrub Person: Barbara Puente          Estimated Blood Loss: minimal    Specimens:                None      Drains: * No LDAs found *    Findings: Right ureteral obstruction distal    Complications: None    Indications: Same    Description of Procedure: Patient brought surgery placed in dorsolithotomy position sterile prep and drape genitalia in routine fashion I passed #20 [place] scope in the bladder right ureter was catheterized 6 cc of contrast placed up the ureter distal right ureteral filling defect was noted we put an 038 guidewire passed this and dilated the ureter with navigator system then went up with the ureteroscope and flushed out stone debris then remove the ureteroscope over top guidewire through-the-scope placed a 6 x 28 double-J stent.  Bladder was drained scope was removed and the patient taken recovery having tolerated the procedure well    Lawrence Gutierrez MD     Date: 1/7/2023  Time: 16:45 CST

## 2023-01-07 NOTE — ANESTHESIA PREPROCEDURE EVALUATION
Anesthesia Evaluation     Patient summary reviewed and Nursing notes reviewed   NPO Solid Status: > 8 hours  NPO Liquid Status: > 4 hours           Airway   Mallampati: I  No difficulty expected  Dental - normal exam     Pulmonary - normal exam   (+) a smoker Current Abstained day of surgery,   Cardiovascular - negative cardio ROS    Rhythm: regular  Rate: normal        Neuro/Psych- negative ROS  GI/Hepatic/Renal/Endo - negative ROS     Musculoskeletal (-) negative ROS    Abdominal    Substance History - negative use     OB/GYN negative ob/gyn ROS         Other - negative ROS                       Anesthesia Plan    ASA 2 - emergent     general   total IV anesthesia  intravenous induction     Anesthetic plan, risks, benefits, and alternatives have been provided, discussed and informed consent has been obtained with: patient.    Plan discussed with CRNA.        CODE STATUS:    Level Of Support Discussed With: Patient  Code Status (Patient has no pulse and is not breathing): CPR (Attempt to Resuscitate)  Medical Interventions (Patient has pulse or is breathing): Full Support

## 2023-01-07 NOTE — H&P
[facility] Medicine Admission      Date of Admission: 1/6/2023      Primary Care Physician: Provider, No Known      Chief Complaint: Urinary tract infection    HPI:  Patient is a 22-year-old female who denies any significant past medical history was initially seen in ED on 1/5/2022 with complaint of right-sided lower pelvic pain with some nausea and burning with urination.  She was diagnosed with urinary tract infection was not was discharged to home on Keflex.  Patient presented tonight to the ED concerning lack of improvement.  ED attending note at the time of this dictation in regard of history of present illness pending.  Patient was diagnosed with urinary tract infection and fever.  Patient was given Rocephin.  Hospitalist service was called for admission of the patient.  I discussed the care with Dr. Lemus.    Patient was seen and examined in ED room 15.  Her mother is present.  Patient does state her complaint is started around 1/5/2022 with dysuria and right distal flank pain.  She reports has been taking antibiotic Keflex.  She had nausea and nonbilious nonbloody vomiting, recently fever.  She complained of some suprapubic pain and right lower flank pain.  She denies any hematuria.  She denies any fall injury trauma headache sore throat chest pain shortness of breath back pain pelvic problems bleeding.  She is sexually active.  She denies any risk for STDs.  She denies any frequent urinary tract infection.  She feels hungry and wants to eat.    Concurrent Medical History:  has a past medical history of Tobacco abuse.    Past Surgical History:  has a past surgical history that includes No past surgeries.    Family History: family history includes Hyperlipidemia in her paternal grandfather; Hypertension in her maternal grandmother.     Social History:  reports that she has been smoking electronic cigarette. She has a 0.25 pack-year smoking history. She has quit  using smokeless tobacco. She reports that she does not drink alcohol and does not use drugs.    Allergies: No Known Allergies    Medications:   Prior to Admission medications    Medication Sig Start Date End Date Taking? Authorizing Provider   cephalexin (KEFLEX) 500 MG capsule Take 1 capsule by mouth 2 (Two) Times a Day for 5 days. 1/5/23 1/10/23  Martin Reynaga MD   etonogestrel-ethinyl estradiol (NUVARING) 0.12-0.015 MG/24HR vaginal ring Insert vaginally and leave in place for 3 consecutive weeks, then remove for 1 week. 11/20/18   Dilcia Diaz APRN   metroNIDAZOLE (METROGEL) 0.75 % vaginal gel Insert  into the vagina 2 (Two) Times a Day for 5 days. 1/5/23 1/10/23  Martin Reynaga MD   silver sulfadiazine (SILVADENE, SSD) 1 % cream Apply  topically to the appropriate area as directed 2 (Two) Times a Day.    Provider, MD Jimenez       Review of Systems:  Review of Systems   Constitutional: Positive for fever. Negative for chills, diaphoresis and fatigue.   HENT: Negative for congestion, dental problem, ear pain, facial swelling, rhinorrhea and sinus pressure.    Eyes: Negative for photophobia, discharge, redness, itching and visual disturbance.   Respiratory: Negative for apnea, cough, choking, chest tightness, shortness of breath, wheezing and stridor.    Cardiovascular: Negative for chest pain, palpitations and leg swelling.   Gastrointestinal: Positive for nausea and vomiting. Negative for abdominal distention, abdominal pain, anal bleeding, blood in stool, diarrhea and rectal pain.   Endocrine: Negative for cold intolerance, heat intolerance, polydipsia, polyphagia and polyuria.   Genitourinary: Positive for dysuria and flank pain. Negative for difficulty urinating, frequency, hematuria and urgency.   Musculoskeletal: Negative for arthralgias, back pain, joint swelling and myalgias.   Skin: Negative for pallor, rash and wound.   Allergic/Immunologic: Negative for environmental allergies and  immunocompromised state.   Neurological: Negative for dizziness, tremors, seizures, facial asymmetry, speech difficulty, weakness, light-headedness, numbness and headaches.   Hematological: Negative for adenopathy. Does not bruise/bleed easily.   Psychiatric/Behavioral: Negative for agitation, behavioral problems and hallucinations. The patient is not nervous/anxious.       Otherwise complete ROS is negative except as mentioned above.    Physical Exam:   Temp:  [102.9 °F (39.4 °C)] 102.9 °F (39.4 °C)  Heart Rate:  [] 94  Resp:  [16-20] 16  BP: ()/(49-72) 88/49  Physical Exam  Constitutional:       General: She is not in acute distress.     Appearance: She is normal weight. She is not ill-appearing, toxic-appearing or diaphoretic.   HENT:      Head: Normocephalic and atraumatic.      Right Ear: External ear normal.      Left Ear: External ear normal.      Nose: Nose normal.      Mouth/Throat:      Mouth: Mucous membranes are moist.      Pharynx: Oropharynx is clear.   Eyes:      Extraocular Movements: Extraocular movements intact.      Conjunctiva/sclera: Conjunctivae normal.      Pupils: Pupils are equal, round, and reactive to light.   Cardiovascular:      Rate and Rhythm: Normal rate and regular rhythm.      Heart sounds: No murmur heard.    No friction rub. No gallop.   Pulmonary:      Effort: No respiratory distress.      Breath sounds: No stridor. No wheezing or rales.   Chest:      Chest wall: No tenderness.   Abdominal:      General: Abdomen is flat. There is no distension.      Palpations: Abdomen is soft.      Tenderness: There is abdominal tenderness (suprapubic and right flank). There is no guarding or rebound.   Musculoskeletal:         General: No swelling or tenderness.      Cervical back: No rigidity or tenderness.      Right lower leg: No edema.      Left lower leg: No edema.   Lymphadenopathy:      Cervical: No cervical adenopathy.   Skin:     General: Skin is warm and dry.       Coloration: Skin is not jaundiced.      Findings: No erythema.   Neurological:      Mental Status: She is alert and oriented to person, place, and time. Mental status is at baseline.      Sensory: No sensory deficit.      Motor: No weakness.      Coordination: Coordination normal.   Psychiatric:         Mood and Affect: Mood normal.         Behavior: Behavior normal.         Judgment: Judgment normal.           Results Reviewed:  I have personally reviewed current lab, radiology, and data and agree with results.  Lab Results (last 24 hours)     Procedure Component Value Units Date/Time    Urinalysis, Microscopic Only - Urine, Clean Catch [[id number]]  (Abnormal) Collected: 01/06/23 2303    Specimen: Urine, Clean Catch Updated: 01/06/23 2324     RBC, UA 0-2 /HPF      WBC, UA Too Numerous to Count /HPF      Bacteria, UA None Seen /HPF      Squamous Epithelial Cells, UA 3-5 /HPF      Hyaline Casts, UA 0-2 /LPF      Methodology Manual Light Microscopy    Urinalysis With Microscopic If Indicated (No Culture) - Urine, Clean Catch [[id number]]  (Abnormal) Collected: 01/06/23 2303    Specimen: Urine, Clean Catch Updated: 01/06/23 2324     Color, UA Yellow     Appearance, UA Clear     pH, UA 6.0     Specific Spreckels, UA 1.003     Comment: Result obtained by Refractometer        Glucose, UA Negative     Ketones, UA 15 mg/dL (1+)     Bilirubin, UA Negative     Blood, UA Trace     Protein, UA Negative     Leuk Esterase, UA Large (3+)     Nitrite, UA Negative     Urobilinogen, UA 0.2 E.U./dL    Chlamydia trachomatis, Neisseria gonorrhoeae, Trichomonas vaginalis, PCR - Urine, Urine, Clean Catch [[id number]] Collected: 01/06/23 2303    Specimen: Urine, Clean Catch Updated: 01/06/23 2306    hCG, Serum, Qualitative [[id number]]  (Normal) Collected: 01/06/23 2026    Specimen: Blood Updated: 01/06/23 2214     HCG Qualitative Negative    Blood Culture - Blood, Arm, Right [[id number]] Collected: 01/06/23 2145    Specimen: Blood from Arm,  Right Updated: 01/06/23 2208    Extra Tubes [[id number]] Collected: 01/06/23 2026    Specimen: Blood Updated: 01/06/23 2131    Narrative:      The following orders were created for panel order Extra Tubes.  Procedure                               Abnormality         Status                     ---------                               -----------         ------                     Gold Top - SST[[id number]]                                   Final result               Light Blue Top[[id number]]                                   Final result                 Please view results for these tests on the individual orders.    Gold Top - SST [[id number]] Collected: 01/06/23 2026    Specimen: Blood Updated: 01/06/23 2131     Extra Tube Hold for add-ons.     Comment: Auto resulted.       Light Blue Top [[id number]] Collected: 01/06/23 2026    Specimen: Blood Updated: 01/06/23 2131     Extra Tube Hold for add-ons.     Comment: Auto resulted       Comprehensive Metabolic Panel [[id number]]  (Abnormal) Collected: 01/06/23 2026    Specimen: Blood Updated: 01/06/23 2047     Glucose 96 mg/dL      BUN 11 mg/dL      Creatinine 0.91 mg/dL      Sodium 132 mmol/L      Potassium 3.8 mmol/L      Chloride 96 mmol/L      CO2 22.0 mmol/L      Calcium 9.0 mg/dL      Total Protein 7.7 g/dL      Albumin 4.2 g/dL      ALT (SGPT) 23 U/L      AST (SGOT) 19 U/L      Alkaline Phosphatase 48 U/L      Total Bilirubin 0.5 mg/dL      Globulin 3.5 gm/dL      A/G Ratio 1.2 g/dL      BUN/Creatinine Ratio 12.1     Anion Gap 14.0 mmol/L      eGFR 92.2 mL/min/1.73      Comment: National Kidney Foundation and American Society of Nephrology (ASN) Task Force recommended calculation based on the Chronic Kidney Disease Epidemiology Collaboration (CKD-EPI) equation refit without adjustment for race.       Narrative:      GFR Normal >60  Chronic Kidney Disease <60  Kidney Failure <15      Magnesium [[id number]]  (Normal) Collected: 01/06/23 2026    Specimen: Blood Updated:  01/06/23 2047     Magnesium 1.8 mg/dL     Lactic Acid, Plasma [[id number]]  (Normal) Collected: 01/06/23 2026    Specimen: Blood Updated: 01/06/23 2043     Lactate 0.9 mmol/L     CBC & Differential [[id number]]  (Abnormal) Collected: 01/06/23 2026    Specimen: Blood Updated: 01/06/23 2030    Narrative:      The following orders were created for panel order CBC & Differential.  Procedure                               Abnormality         Status                     ---------                               -----------         ------                     CBC Auto Differential[[id number]]        Abnormal            Final result                 Please view results for these tests on the individual orders.    CBC Auto Differential [[id number]]  (Abnormal) Collected: 01/06/23 2026    Specimen: Blood Updated: 01/06/23 2030     WBC 14.59 10*3/mm3      RBC 4.62 10*6/mm3      Hemoglobin 13.9 g/dL      Hematocrit 39.5 %      MCV 85.5 fL      MCH 30.1 pg      MCHC 35.2 g/dL      RDW 12.6 %      RDW-SD 38.7 fl      MPV 9.5 fL      Platelets 274 10*3/mm3      Neutrophil % 84.7 %      Lymphocyte % 7.9 %      Monocyte % 6.6 %      Eosinophil % 0.1 %      Basophil % 0.4 %      Immature Grans % 0.3 %      Neutrophils, Absolute 12.36 10*3/mm3      Lymphocytes, Absolute 1.15 10*3/mm3      Monocytes, Absolute 0.97 10*3/mm3      Eosinophils, Absolute 0.01 10*3/mm3      Basophils, Absolute 0.06 10*3/mm3      Immature Grans, Absolute 0.04 10*3/mm3      nRBC 0.0 /100 WBC     Blood Culture - Blood, Arm, Left [[id number]] Collected: 01/06/23 2026    Specimen: Blood from Arm, Left Updated: 01/06/23 2026        Imaging Results (Last 24 Hours)     Procedure Component Value Units Date/Time    CT Abdomen Pelvis Without Contrast [[id number]] Collected: 01/06/23 2224     Updated: 01/06/23 2355    Narrative:      EXAM: CT Abdomen and Pelvis without contrast     INDICATION: uti, suprapubic, right flank pain, sepsis    TECHNIQUE: Helical CT of the abdomen and  pelvis was obtained from  the diaphragm through the ischial tuberosities without contrast.  5 mm axial images were created as were coronal and sagittal  reformats.    Dose reduction techniques were used including automated exposure  control and/or adjustment of the mA and/or kV according to  patient size.    COMPARISON: Ultrasound from 1/5/2023    The lack of IV contrast significantly decreases the sensitivity  of this study for the evaluation of solid abdominal organs,  hollow viscera and vascular structures.    FINDINGS:  LUNG BASES: 3 mm right middle lobe pulmonary nodule. Right  basilar atelectasis.    STOMACH: No significant finding.    LIVER: No significant abnormality.     BILIARY: No significant abnormality.    PANCREAS: No significant abnormality.    SPLEEN: No significant abnormality.    ADRENAL GLANDS: No significant abnormality.    KIDNEYS/BLADDER:  Evaluation for pyelonephritis is limited  secondary to lack of IV contrast. Mild right perinephric and  periureteral stranding. 2 mm calculus in location the distal  right ureter. Minimal upstream hydronephrosis. No left-sided  hydronephrosis or urinary tract calculi.    VESSELS: Nonaneurysmal abdominal aorta.    LYMPH NODES: No enlarged abdominal or pelvic lymph nodes.    OTHER PELVIC: No free pelvic fluid.    GI TRACT: No significant abnormality. Normal appendix.    ABDOMINAL WALL: No significant abnormality.    PERITONEUM: No ascites or pneumoperitoneum.     BONES/SPINE: No acute abnormality.        Impression:        2 mm calcification in the location of the distal right ureter,  concerning for ureteral calculus resulting in minimal upstream  hydronephrosis. There is also right periureteral/perinephric  stranding concerning for superimposed urinary tract infection.  Evaluation for pyelonephritis is limited secondary to lack of IV  contrast.    INCIDENTAL FINDINGS:    None requiring follow-up.    Electronically signed by:  Jorge Alberto Robb MD  1/6/2023  11:53 PM  CST Workstation: [id number]            Assessment:    Active Hospital Problems    Diagnosis    • **Sepsis (HCC)      # Sepsis, sepsis present on admission in setting of urinary tract infection  # Complicated E. coli urinary tract  infection, right pyelonephritis  with failure of outpatient therapy  # 2 mm distal right fibular test on with mild hydronephrosis  # Nausea vomiting abdominal pain flank pain secondary to above   # Leukocytosis reactive  # Asymptomatic hypotension   # Mild hyponatremia possible secondary to nausea and vomiting.      Plan:  Patient does not have any history of recurrent urinary tract infection.  Possibility of multidrug-resistant urinary tract infection is low.  However patient has sepsis and was outpatient on Keflex.  Maintain patient on Zosyn pending availability of urine culture results.  Patient received Rocephin in ED.  Obtain further laboratory work-up including TSH level,in evaluation of mild hyponatremia  Follow CBC, BMP.  Place on broad-spectrum IV antibiotic as above  Give IV fluid bolus considering asymptomatic hypotension with presence of sepsis  Place on IV fluid high rate considering sepsis (despite mild hydronephrosis)  Place on oral and IV analgesics, antiemetic, supportive therapy  Urology service Dr. Gutierrez was informed in ED await for input.  Strain all urine  Follow laboratory work-up  DVT and GI prophylaxis will be initiated  Please see orders for comprehensive plan      Medical Decision Making  Number and Complexity of problems: 3 acute and complex medical problem  Differential Diagnosis: Entertained    Conditions and Status:        Condition is worsening.  Requires hospitalization     MDM Data  External documents reviewed: Recent ED record reviewed  My EKG interpretation:   My CT interpretation: As above  Tests considered but not ordered:           Discussed with: ED attending Dr. Lemus patient and her mother.   I have utilized all available immediate  resources to obtain, update, or review the patient's current medications (including all prescriptions, over-the-counter products, herbals, cannabis/cannabidiol products, and vitamin/mineral/dietary (nutritional) supplements).      Treatment Plan  As above discussed    Care Planning  Shared decision making: Patient and her mother.  Code status and discussions: Full code    Disposition  Social Determinants of Health that impact treatment or disposition:   I expect the patient to be discharged to home in 2-3 days.          I confirmed that the patient's Advance Care Plan is present, code status is documented, or surrogate decision maker is listed in the patient's medical record.     I have utilized all available immediate resources to obtain, update, or review the patient's current medications.     I discussed the patient's findings and my recommendations with: Patient and her mother both agreed with above plan of care      Saeid Behroozi, MD   01/07/23   01:13 CST

## 2023-01-07 NOTE — ED NOTES
Nursing report ED to floor  Yasmeen Sandoval  22 y.o.  female    HPI:   Chief Complaint   Patient presents with    Fever    Nausea    Vomiting       Admitting doctor:   No admitting provider for patient encounter.    Consulting provider(s):  Consults       Date and Time Order Name Status Description    1/7/2023  1:00 AM Urology (on-call MD unless specified)      1/7/2023  1:00 AM Hospitalist (on-call MD unless specified)               Admitting diagnosis:   The primary encounter diagnosis was Sepsis without acute organ dysfunction, due to unspecified organism (HCC). Diagnoses of Pyelonephritis and Ureterolithiasis were also pertinent to this visit.    Code status:   Current Code Status       Date Active Code Status Order ID Comments User Context       1/7/2023 0124 CPR (Attempt to Resuscitate) [id number]  Behroozi, Saeid, MD ED        Question Answer    Code Status (Patient has no pulse and is not breathing) CPR (Attempt to Resuscitate)    Medical Interventions (Patient has pulse or is breathing) Full Support    Level Of Support Discussed With Patient                    Allergies:   Patient has no known allergies.    Intake and Output    Intake/Output Summary (Last 24 hours) at 1/7/2023 0630  Last data filed at 1/7/2023 0249  Gross per 24 hour   Intake 3200 ml   Output --   Net 3200 ml       Weight:       01/06/23  1946   Weight: 59.9 kg (132 lb)       Most recent vitals:   Vitals:    01/07/23 0400 01/07/23 0503 01/07/23 0603 01/07/23 0615   BP: 105/57 108/57 110/58    BP Location:       Patient Position:       Pulse: 94 96 106    Resp:  16 16    Temp:    (!) 102.6 °F (39.2 °C)   TempSrc:    Oral   SpO2: 98% 98% 96%    Weight:       Height:         Oxygen Therapy: Room air    Active LDAs/IV Access:   Lines, Drains & Airways       Active LDAs       Name Placement date Placement time Site Days    Peripheral IV 01/06/23 2027 Left Antecubital 01/06/23 2027  Antecubital  less than 1    Peripheral IV 01/06/23 2932  Left;Posterior Hand 01/06/23  2145  Hand  less than 1                    Labs (abnormal labs have a star):   Labs Reviewed   COMPREHENSIVE METABOLIC PANEL - Abnormal; Notable for the following components:       Result Value    Sodium 132 (*)     Chloride 96 (*)     All other components within normal limits    Narrative:     GFR Normal >60  Chronic Kidney Disease <60  Kidney Failure <15     URINALYSIS W/ MICROSCOPIC IF INDICATED (NO CULTURE) - Abnormal; Notable for the following components:    Ketones, UA 15 mg/dL (1+) (*)     Blood, UA Trace (*)     Leuk Esterase, UA Large (3+) (*)     All other components within normal limits   CBC WITH AUTO DIFFERENTIAL - Abnormal; Notable for the following components:    WBC 14.59 (*)     Neutrophil % 84.7 (*)     Lymphocyte % 7.9 (*)     Eosinophil % 0.1 (*)     Neutrophils, Absolute 12.36 (*)     Monocytes, Absolute 0.97 (*)     All other components within normal limits   URINALYSIS, MICROSCOPIC ONLY - Abnormal; Notable for the following components:    RBC, UA 0-2 (*)     WBC, UA Too Numerous to Count (*)     Squamous Epithelial Cells, UA 3-5 (*)     All other components within normal limits   BASIC METABOLIC PANEL - Abnormal; Notable for the following components:    Sodium 135 (*)     CO2 20.0 (*)     Calcium 7.3 (*)     All other components within normal limits    Narrative:     GFR Normal >60  Chronic Kidney Disease <60  Kidney Failure <15     CBC (NO DIFF) - Abnormal; Notable for the following components:    WBC 15.71 (*)     Hemoglobin 11.9 (*)     All other components within normal limits   LACTIC ACID, PLASMA - Normal   MAGNESIUM - Normal   HCG, SERUM, QUALITATIVE - Normal   MAGNESIUM - Normal   TSH - Normal   HEMOGLOBIN A1C - Normal    Narrative:     Hemoglobin A1C Ranges:    Increased Risk for Diabetes  5.7% to 6.4%  Diabetes                     >= 6.5%  Diabetic Goal                < 7.0%   BLOOD CULTURE   BLOOD CULTURE   CHLAMYDIA TRACHOMATIS, NEISSERIA GONORRHOEAE,  TRICHOMONAS VAGINALIS, PCR   URINE CULTURE   CBC AND DIFFERENTIAL    Narrative:     The following orders were created for panel order CBC & Differential.  Procedure                               Abnormality         Status                     ---------                               -----------         ------                     CBC Auto Differential[[id number]]        Abnormal            Final result                 Please view results for these tests on the individual orders.   EXTRA TUBES    Narrative:     The following orders were created for panel order Extra Tubes.  Procedure                               Abnormality         Status                     ---------                               -----------         ------                     Gold Top - SST[[id number]]                                   Final result               Light Blue Top[[id number]]                                   Final result                 Please view results for these tests on the individual orders.   GOLD TOP - SST   LIGHT BLUE TOP       Meds given in ED:   Medications   sodium chloride 0.9 % flush 10 mL (has no administration in time range)   sodium chloride 0.9 % flush 10 mL (10 mL Intravenous Given 1/7/23 0209)   sodium chloride 0.9 % flush 10 mL (has no administration in time range)   sodium chloride 0.9 % infusion 40 mL (has no administration in time range)   acetaminophen (TYLENOL) tablet 650 mg (650 mg Oral Given 1/7/23 0621)   piperacillin-tazobactam (ZOSYN) 3.375 g/100 mL 0.9% NS IVPB (mbp) (has no administration in time range)   lactated ringers infusion (125 mL/hr Intravenous Currently Infusing 1/7/23 0448)   ketorolac (TORADOL) injection 15 mg (has no administration in time range)   morphine injection 2 mg (2 mg Intravenous Given 1/7/23 0202)   HYDROcodone-acetaminophen (NORCO) 5-325 MG per tablet 1 tablet (has no administration in time range)   ondansetron (ZOFRAN) injection 4 mg (4 mg Intravenous Given 1/7/23 0203)   sodium  chloride 0.9 % bolus 1,000 mL ( Intravenous Canceled Entry 1/6/23 2150)   ondansetron (ZOFRAN) injection 4 mg (4 mg Intravenous Given 1/6/23 2045)   ketorolac (TORADOL) injection 30 mg (30 mg Intravenous Given 1/6/23 2044)   cefTRIAXone (ROCEPHIN) 1 g/100 mL 0.9% NS (MBP) (0 g Intravenous Stopped 1/6/23 2215)   sodium chloride 0.9 % bolus 1,000 mL (0 mL Intravenous Stopped 1/7/23 0046)   tamsulosin (FLOMAX) 24 hr capsule 0.4 mg (0.4 mg Oral Given 1/7/23 0016)   piperacillin-tazobactam (ZOSYN) 3.375 g/100 mL 0.9% NS IVPB (mbp) (0 g Intravenous Stopped 1/7/23 0249)   sodium chloride 0.9 % bolus 1,000 mL (0 mL Intravenous Stopped 1/7/23 0232)     lactated ringers, 125 mL/hr, Last Rate: 125 mL/hr (01/07/23 0448)         NIH Stroke Scale:       Isolation/Infection(s):  No active isolations   No active infections     COVID Testing  Collected N/A  Resulted N/A    Nursing report ED to floor:  Mental status: A+O x4  Ambulatory status: A+O x4  Precautions: None    ED nurse phone extentsion- [phone]

## 2023-01-07 NOTE — ED PROVIDER NOTES
Subjective   History of Present Illness  22 year old female presents with fever, increasing right sided abdominal and flank pain after being diagnosed with a uti yesterday. Pain into back. Fever to 103. Chills. Nausea and vomiting. Dysuria.     Family history, surgical history, social history, current medications and allergies are reviewed with the patient and triage documentation and vitals are reviewed.    History provided by:  Patient and medical records   used: No        Review of Systems   Constitutional: Positive for chills, fatigue and fever.   HENT: Negative.    Eyes: Negative for photophobia and visual disturbance.   Respiratory: Negative for cough and shortness of breath.    Cardiovascular: Negative.    Gastrointestinal: Positive for abdominal pain, nausea and vomiting. Negative for constipation and diarrhea.   Endocrine: Negative for polydipsia, polyphagia and polyuria.   Genitourinary: Positive for dysuria and flank pain. Negative for urgency, vaginal bleeding, vaginal discharge and vaginal pain.   Musculoskeletal: Negative for arthralgias, back pain, myalgias and neck pain.   Skin: Negative for rash and wound.   Allergic/Immunologic: Negative.    Neurological: Negative.    Hematological: Negative.    Psychiatric/Behavioral: Negative.        Past Medical History:   Diagnosis Date   • Tobacco abuse        No Known Allergies    Past Surgical History:   Procedure Laterality Date   • NO PAST SURGERIES         Family History   Problem Relation Age of Onset   • Hyperlipidemia Paternal Grandfather    • Hypertension Maternal Grandmother        Social History     Socioeconomic History   • Marital status: Single   Tobacco Use   • Smoking status: Some Days     Packs/day: 0.25     Years: 1.00     Pack years: 0.25     Types: Electronic Cigarette, Cigarettes   • Smokeless tobacco: Former   Substance and Sexual Activity   • Alcohol use: No   • Drug use: No   • Sexual activity: Defer            Objective   Physical Exam  Vitals and nursing note reviewed.   Constitutional:       General: She is not in acute distress.     Appearance: Normal appearance. She is normal weight. She is ill-appearing. She is not toxic-appearing or diaphoretic.   HENT:      Head: Normocephalic.      Mouth/Throat:      Mouth: Mucous membranes are moist.      Pharynx: Oropharynx is clear.   Eyes:      General: No scleral icterus.     Conjunctiva/sclera: Conjunctivae normal.      Pupils: Pupils are equal, round, and reactive to light.   Cardiovascular:      Rate and Rhythm: Regular rhythm. Tachycardia present.      Pulses: Normal pulses.      Heart sounds: No murmur heard.  Pulmonary:      Effort: Pulmonary effort is normal. No respiratory distress.      Breath sounds: Normal breath sounds. No wheezing or rhonchi.   Abdominal:      General: Bowel sounds are normal.      Palpations: Abdomen is soft.      Tenderness: There is abdominal tenderness. There is right CVA tenderness. There is no left CVA tenderness, guarding or rebound.   Musculoskeletal:         General: Normal range of motion.      Cervical back: Normal range of motion and neck supple.   Skin:     General: Skin is warm and dry.      Capillary Refill: Capillary refill takes less than 2 seconds.      Coloration: Skin is pale.   Neurological:      General: No focal deficit present.      Mental Status: She is alert and oriented to person, place, and time.      Motor: No weakness.   Psychiatric:         Mood and Affect: Mood normal.         Behavior: Behavior normal.         ECG 12 Lead      Date/Time: 1/6/2023 7:49 PM  Performed by: Micheal Lemus DO  Authorized by: Micheal Lemus DO   Interpreted by physician  Comments: EKG January 6, 2023 1949 reveals sinus tachycardia rate of 143 bpm.  Right axis deviation.  QTc 395.  No obvious acute ischemia.                 ED Course      Labs Reviewed   COMPREHENSIVE METABOLIC PANEL - Abnormal; Notable for the  following components:       Result Value    Sodium 132 (*)     Chloride 96 (*)     All other components within normal limits    Narrative:     GFR Normal >60  Chronic Kidney Disease <60  Kidney Failure <15     URINALYSIS W/ MICROSCOPIC IF INDICATED (NO CULTURE) - Abnormal; Notable for the following components:    Ketones, UA 15 mg/dL (1+) (*)     Blood, UA Trace (*)     Leuk Esterase, UA Large (3+) (*)     All other components within normal limits   CBC WITH AUTO DIFFERENTIAL - Abnormal; Notable for the following components:    WBC 14.59 (*)     Neutrophil % 84.7 (*)     Lymphocyte % 7.9 (*)     Eosinophil % 0.1 (*)     Neutrophils, Absolute 12.36 (*)     Monocytes, Absolute 0.97 (*)     All other components within normal limits   URINALYSIS, MICROSCOPIC ONLY - Abnormal; Notable for the following components:    RBC, UA 0-2 (*)     WBC, UA Too Numerous to Count (*)     Squamous Epithelial Cells, UA 3-5 (*)     All other components within normal limits   LACTIC ACID, PLASMA - Normal   MAGNESIUM - Normal   HCG, SERUM, QUALITATIVE - Normal   BLOOD CULTURE   BLOOD CULTURE   CHLAMYDIA TRACHOMATIS, NEISSERIA GONORRHOEAE, TRICHOMONAS VAGINALIS, PCR   URINE CULTURE   BASIC METABOLIC PANEL   CBC (NO DIFF)   MAGNESIUM   TSH   HEMOGLOBIN A1C   CBC AND DIFFERENTIAL    Narrative:     The following orders were created for panel order CBC & Differential.  Procedure                               Abnormality         Status                     ---------                               -----------         ------                     CBC Auto Differential[[id number]]        Abnormal            Final result                 Please view results for these tests on the individual orders.   EXTRA TUBES    Narrative:     The following orders were created for panel order Extra Tubes.  Procedure                               Abnormality         Status                     ---------                               -----------         ------                      Gold Top - SST[[id number]]                                   Final result               Light Blue Top[[id number]]                                   Final result                 Please view results for these tests on the individual orders.   [facility]   LIGHT BLUE TOP     CT Abdomen Pelvis Without Contrast    Result Date: 1/6/2023  Narrative: EXAM: CT Abdomen and Pelvis without contrast INDICATION: uti, suprapubic, right flank pain, sepsis TECHNIQUE: Helical CT of the abdomen and pelvis was obtained from the diaphragm through the ischial tuberosities without contrast. 5 mm axial images were created as were coronal and sagittal reformats. Dose reduction techniques were used including automated exposure control and/or adjustment of the mA and/or kV according to patient size. COMPARISON: Ultrasound from 1/5/2023 The lack of IV contrast significantly decreases the sensitivity of this study for the evaluation of solid abdominal organs, hollow viscera and vascular structures. FINDINGS: LUNG BASES: 3 mm right middle lobe pulmonary nodule. Right basilar atelectasis. STOMACH: No significant finding. LIVER: No significant abnormality. BILIARY: No significant abnormality. PANCREAS: No significant abnormality. SPLEEN: No significant abnormality. ADRENAL GLANDS: No significant abnormality. KIDNEYS/BLADDER:  Evaluation for pyelonephritis is limited secondary to lack of IV contrast. Mild right perinephric and periureteral stranding. 2 mm calculus in location the distal right ureter. Minimal upstream hydronephrosis. No left-sided hydronephrosis or urinary tract calculi. VESSELS: Nonaneurysmal abdominal aorta. LYMPH NODES: No enlarged abdominal or pelvic lymph nodes. OTHER PELVIC: No free pelvic fluid. GI TRACT: No significant abnormality. Normal appendix. ABDOMINAL WALL: No significant abnormality. PERITONEUM: No ascites or pneumoperitoneum. BONES/SPINE: No acute abnormality.     Impression: 2 mm calcification in the  location of the distal right ureter, concerning for ureteral calculus resulting in minimal upstream hydronephrosis. There is also right periureteral/perinephric stranding concerning for superimposed urinary tract infection. Evaluation for pyelonephritis is limited secondary to lack of IV contrast. INCIDENTAL FINDINGS: None requiring follow-up. Electronically signed by:  Jorge Alberto Robb MD  1/6/2023 11:53 PM CST Workstation: [id number]    US Non-ob Transvaginal    Addendum Date: 1/5/2023 Addendum:    ADDENDUM ADDENDUM #1 Doppler evaluation of the ovaries demonstrates normal vascular flow bilaterally. Electronically signed by:  Neil Arteaga MD  1/5/2023 4:14 PM CST Workstation: [id number]     Result Date: 1/5/2023  Narrative: EXAM DESCRIPTION:  US PELVIS TRANSVAGINAL CLINICAL INDICATION: pelvic pain COMPARISON: None FINDINGS: The uterus is unremarkable in appearance and measures 6.0 x 3.3 x 5.0 cm. The endometrial canal measures within normal limits at 0.5 cm. Both ovaries contain small follicles and demonstrate vascular flow. The right ovary measures 2.9 x 2.3 x 1.5 cm and the left 1.9 x 1.6 x 2.7 cm. Small amount of pelvic free fluid is noted.     Impression: 1. Small amount of pelvic free fluid, likely physiologic. 2. Normal sonographic appearance of the ovaries. 3. Normal caliber endometrial canal. Electronically signed by:  Neil Arteaga MD  1/5/2023 2:52 PM CST Workstation: [id number]        Medical Decision Making  Pyelonephritis: acute illness or injury  Sepsis without acute organ dysfunction, due to unspecified organism (HCC): acute illness or injury  Ureterolithiasis: acute illness or injury  Amount and/or Complexity of Data Reviewed  Labs: ordered. Decision-making details documented in ED Course.  Radiology: ordered. Decision-making details documented in ED Course.  ECG/medicine tests: ordered and independent interpretation performed.      Risk  Prescription drug management.  Decision regarding  hospitalization.      Patient with continued UTI.  Sepsis per fever, tachycardia, and known infection.  Flank pain concerning for pyelonephritis.  CT reveals a right distal ureteral lithiasis with hydroureteronephrosis.  Fluid hydration initiated.  Rocephin initiated.  Simple sepsis with normal lactic and blood pressure with response to fluid bolus.  Discussed with patient need for admission given her findings and partially obstructing stone.  Agreeable to plan.  Dr. Gutierrez consulted.  Admit to hospitalist service after discussing case with Dr. Behroozi.    Final diagnoses:   Sepsis without acute organ dysfunction, due to unspecified organism (HCC)   Pyelonephritis   Ureterolithiasis       ED Disposition  ED Disposition     ED Disposition   Decision to Admit    Condition   --    Comment   Level of Care: Med/Surg [1]   Diagnosis: Sepsis (HCC) [[id number]]               No follow-up provider specified.       Medication List      No changes were made to your prescriptions during this visit.          Micheal Lemus DO  01/07/23 0153

## 2023-01-07 NOTE — PROGRESS NOTES
LOS: 0 days     Patient Care Team:  Provider, No Known as PCP - General      Subjective     Distal right ureteral stone    Objective       Vital Signs  Temp:  [98.1 °F (36.7 °C)-102.9 °F (39.4 °C)] 98.1 °F (36.7 °C)  Heart Rate:  [] 100  Resp:  [16-20] 16  BP: ()/(49-72) 107/57    Physical Exam:        General Appearance:   No acute     Respiratory:    UNLABORED RESPIRATIONS.     Abdomen:     SOFT.       Genitourinary:  Urine clear     Rectal:     DEFERRED       Results Review:       Imaging Results (Last 24 Hours)     Procedure Component Value Units Date/Time    CT Abdomen Pelvis Without Contrast [[id number]] Collected: 01/06/23 2224     Updated: 01/06/23 2355    Narrative:      EXAM: CT Abdomen and Pelvis without contrast     INDICATION: uti, suprapubic, right flank pain, sepsis    TECHNIQUE: Helical CT of the abdomen and pelvis was obtained from  the diaphragm through the ischial tuberosities without contrast.  5 mm axial images were created as were coronal and sagittal  reformats.    Dose reduction techniques were used including automated exposure  control and/or adjustment of the mA and/or kV according to  patient size.    COMPARISON: Ultrasound from 1/5/2023    The lack of IV contrast significantly decreases the sensitivity  of this study for the evaluation of solid abdominal organs,  hollow viscera and vascular structures.    FINDINGS:  LUNG BASES: 3 mm right middle lobe pulmonary nodule. Right  basilar atelectasis.    STOMACH: No significant finding.    LIVER: No significant abnormality.     BILIARY: No significant abnormality.    PANCREAS: No significant abnormality.    SPLEEN: No significant abnormality.    ADRENAL GLANDS: No significant abnormality.    KIDNEYS/BLADDER:  Evaluation for pyelonephritis is limited  secondary to lack of IV contrast. Mild right perinephric and  periureteral stranding. 2 mm calculus in location the distal  right ureter. Minimal upstream hydronephrosis. No  left-sided  hydronephrosis or urinary tract calculi.    VESSELS: Nonaneurysmal abdominal aorta.    LYMPH NODES: No enlarged abdominal or pelvic lymph nodes.    OTHER PELVIC: No free pelvic fluid.    GI TRACT: No significant abnormality. Normal appendix.    ABDOMINAL WALL: No significant abnormality.    PERITONEUM: No ascites or pneumoperitoneum.     BONES/SPINE: No acute abnormality.        Impression:        2 mm calcification in the location of the distal right ureter,  concerning for ureteral calculus resulting in minimal upstream  hydronephrosis. There is also right periureteral/perinephric  stranding concerning for superimposed urinary tract infection.  Evaluation for pyelonephritis is limited secondary to lack of IV  contrast.    INCIDENTAL FINDINGS:    None requiring follow-up.    Electronically signed by:  Jorge Alberto Robb MD  1/6/2023 11:53 PM  CST Workstation: [id number]        Lab Results (last 24 hours)     Procedure Component Value Units Date/Time    Chlamydia trachomatis, Neisseria gonorrhoeae, Trichomonas vaginalis, PCR - Urine, Urine, Clean Catch [[id number]]  (Normal) Collected: 01/06/23 2303    Specimen: Urine, Clean Catch Updated: 01/07/23 1008     Chlamydia DNA by PCR Negative     Neisseria gonorrhoeae by PCR Negative     Trichomonas vaginalis PCR Negative    TSH [[id number]]  (Normal) Collected: 01/07/23 0512    Specimen: Blood Updated: 01/07/23 0609     TSH 1.320 uIU/mL     Basic Metabolic Panel [[id number]]  (Abnormal) Collected: 01/07/23 0512    Specimen: Blood Updated: 01/07/23 0605     Glucose 95 mg/dL      BUN 11 mg/dL      Creatinine 0.80 mg/dL      Sodium 135 mmol/L      Potassium 4.0 mmol/L      Chloride 105 mmol/L      CO2 20.0 mmol/L      Calcium 7.3 mg/dL      BUN/Creatinine Ratio 13.8     Anion Gap 10.0 mmol/L      eGFR 107.7 mL/min/1.73      Comment: National Kidney Foundation and American Society of Nephrology (ASN) Task Force recommended calculation based on the Chronic Kidney  Disease Epidemiology Collaboration (CKD-EPI) equation refit without adjustment for race.       Narrative:      GFR Normal >60  Chronic Kidney Disease <60  Kidney Failure <15      Magnesium [[id number]]  (Normal) Collected: 01/07/23 0512    Specimen: Blood Updated: 01/07/23 0605     Magnesium 1.7 mg/dL     CBC (No Diff) [[id number]]  (Abnormal) Collected: 01/07/23 0512    Specimen: Blood Updated: 01/07/23 0559     WBC 15.71 10*3/mm3      RBC 4.01 10*6/mm3      Hemoglobin 11.9 g/dL      Hematocrit 35.7 %      MCV 89.0 fL      MCH 29.7 pg      MCHC 33.3 g/dL      RDW 12.4 %      RDW-SD 40.4 fl      MPV 9.6 fL      Platelets 238 10*3/mm3     Hemoglobin A1c [[id number]]  (Normal) Collected: 01/07/23 0512    Specimen: Blood Updated: 01/07/23 0558     Hemoglobin A1C 4.80 %     Narrative:      Hemoglobin A1C Ranges:    Increased Risk for Diabetes  5.7% to 6.4%  Diabetes                     >= 6.5%  Diabetic Goal                < 7.0%    Urine Culture - Urine, Urine, Clean Catch [[id number]] Collected: 01/06/23 2303    Specimen: Urine, Clean Catch Updated: 01/07/23 0130    Urinalysis, Microscopic Only - Urine, Clean Catch [[id number]]  (Abnormal) Collected: 01/06/23 2303    Specimen: Urine, Clean Catch Updated: 01/06/23 2324     RBC, UA 0-2 /HPF      WBC, UA Too Numerous to Count /HPF      Bacteria, UA None Seen /HPF      Squamous Epithelial Cells, UA 3-5 /HPF      Hyaline Casts, UA 0-2 /LPF      Methodology Manual Light Microscopy    Urinalysis With Microscopic If Indicated (No Culture) - Urine, Clean Catch [[id number]]  (Abnormal) Collected: 01/06/23 2303    Specimen: Urine, Clean Catch Updated: 01/06/23 2324     Color, UA Yellow     Appearance, UA Clear     pH, UA 6.0     Specific [place], UA 1.003     Comment: Result obtained by Refractometer        Glucose, UA Negative     Ketones, UA 15 mg/dL (1+)     Bilirubin, UA Negative     Blood, UA Trace     Protein, UA Negative     Leuk Esterase, UA Large (3+)     Nitrite, UA Negative      Urobilinogen, UA 0.2 E.U./dL    hCG, Serum, Qualitative [[id number]]  (Normal) Collected: 01/06/23 2026    Specimen: Blood Updated: 01/06/23 2214     HCG Qualitative Negative    Blood Culture - Blood, Arm, Right [[id number]] Collected: 01/06/23 2145    Specimen: Blood from Arm, Right Updated: 01/06/23 2208    Extra Tubes [[id number]] Collected: 01/06/23 2026    Specimen: Blood Updated: 01/06/23 2131    Narrative:      The following orders were created for panel order Extra Tubes.  Procedure                               Abnormality         Status                     ---------                               -----------         ------                     Gold Top - SST[[id number]]                                   Final result               Light Blue Top[[id number]]                                   Final result                 Please view results for these tests on the individual orders.    Gold Top - SST [[id number]] Collected: 01/06/23 2026    Specimen: Blood Updated: 01/06/23 2131     Extra Tube Hold for add-ons.     Comment: Auto resulted.       Light Blue Top [[id number]] Collected: 01/06/23 2026    Specimen: Blood Updated: 01/06/23 2131     Extra Tube Hold for add-ons.     Comment: Auto resulted       Comprehensive Metabolic Panel [[id number]]  (Abnormal) Collected: 01/06/23 2026    Specimen: Blood Updated: 01/06/23 2047     Glucose 96 mg/dL      BUN 11 mg/dL      Creatinine 0.91 mg/dL      Sodium 132 mmol/L      Potassium 3.8 mmol/L      Chloride 96 mmol/L      CO2 22.0 mmol/L      Calcium 9.0 mg/dL      Total Protein 7.7 g/dL      Albumin 4.2 g/dL      ALT (SGPT) 23 U/L      AST (SGOT) 19 U/L      Alkaline Phosphatase 48 U/L      Total Bilirubin 0.5 mg/dL      Globulin 3.5 gm/dL      A/G Ratio 1.2 g/dL      BUN/Creatinine Ratio 12.1     Anion Gap 14.0 mmol/L      eGFR 92.2 mL/min/1.73      Comment: National Kidney Foundation and American Society of Nephrology (ASN) Task Force recommended calculation based on the  Chronic Kidney Disease Epidemiology Collaboration (CKD-EPI) equation refit without adjustment for race.       Narrative:      GFR Normal >60  Chronic Kidney Disease <60  Kidney Failure <15      Magnesium [[id number]]  (Normal) Collected: 01/06/23 2026    Specimen: Blood Updated: 01/06/23 2047     Magnesium 1.8 mg/dL     Lactic Acid, Plasma [[id number]]  (Normal) Collected: 01/06/23 2026    Specimen: Blood Updated: 01/06/23 2043     Lactate 0.9 mmol/L     CBC & Differential [[id number]]  (Abnormal) Collected: 01/06/23 2026    Specimen: Blood Updated: 01/06/23 2030    Narrative:      The following orders were created for panel order CBC & Differential.  Procedure                               Abnormality         Status                     ---------                               -----------         ------                     CBC Auto Differential[[id number]]        Abnormal            Final result                 Please view results for these tests on the individual orders.    CBC Auto Differential [[id number]]  (Abnormal) Collected: 01/06/23 2026    Specimen: Blood Updated: 01/06/23 2030     WBC 14.59 10*3/mm3      RBC 4.62 10*6/mm3      Hemoglobin 13.9 g/dL      Hematocrit 39.5 %      MCV 85.5 fL      MCH 30.1 pg      MCHC 35.2 g/dL      RDW 12.6 %      RDW-SD 38.7 fl      MPV 9.5 fL      Platelets 274 10*3/mm3      Neutrophil % 84.7 %      Lymphocyte % 7.9 %      Monocyte % 6.6 %      Eosinophil % 0.1 %      Basophil % 0.4 %      Immature Grans % 0.3 %      Neutrophils, Absolute 12.36 10*3/mm3      Lymphocytes, Absolute 1.15 10*3/mm3      Monocytes, Absolute 0.97 10*3/mm3      Eosinophils, Absolute 0.01 10*3/mm3      Basophils, Absolute 0.06 10*3/mm3      Immature Grans, Absolute 0.04 10*3/mm3      nRBC 0.0 /100 WBC     Blood Culture - Blood, Arm, Left [[id number]] Collected: 01/06/23 2026    Specimen: Blood from Arm, Left Updated: 01/06/23 2026            I reviewed the patient's new clinical results.  I reviewed the  patient's new imaging results and agree with the interpretation.  I reviewed the patient's other test results and agree with the interpretation        Assessment:    *Right ureteral stone    Plan:     Cystoscopy right retrograde ureteroscopy laser lithotripsy J stent placement risk and benefits of been discussed      Lawrence Gutierrez MD  01/07/23  16:01 CST

## 2023-01-07 NOTE — ANESTHESIA POSTPROCEDURE EVALUATION
Patient: Yasmeen Sandoval    Procedure Summary     Date: 01/07/23 Room / Location: [facility] OR 02 / [facility] OR    Anesthesia Start: 1612 Anesthesia Stop: 1652    Procedure: CYSTOSCOPY RIGHT URETEROSCOPY RETROGRADE PYELOGRAM HOLMIUM STENT INSERTION (Right) Diagnosis:       Ureterolithiasis      (Ureterolithiasis [N20.1])    Surgeons: Brenda, Lawrence CHU MD Provider: Garret Dumont CRNA    Anesthesia Type: general ASA Status: 2 - Emergent          Anesthesia Type: general    Vitals  Vitals Value Taken Time   BP 91/47 01/07/23 1652   Temp 98.4 °F (36.9 °C) 01/07/23 1652   Pulse 87 01/07/23 1652   Resp 16 01/07/23 1652   SpO2 93 % 01/07/23 1652           Post Anesthesia Care and Evaluation    Patient location during evaluation: PACU  Patient participation: waiting for patient participation  Level of consciousness: sleepy but conscious  Pain management: adequate    Airway patency: patent  Anesthetic complications: No anesthetic complications  PONV Status: none  Cardiovascular status: acceptable  Respiratory status: acceptable, room air and spontaneous ventilation  Hydration status: acceptable    Comments: ---------------------------               01/07/23 1652         ---------------------------   BP:           91/47         Pulse:         87           Resp:          16           Temp:   98.4 °F (36.9 °C)   SpO2:          93%         ---------------------------

## 2023-01-07 NOTE — CONSULTS
"[facility]   Consult Note    Patient Name: Yasmeen Sandoval  : 2000  MRN: [id number]  Primary Care Physician:  Provider, No Known  Referring Physician: Micheal Lemus DO  Date of admission: 2023    Urology (on-call MD unless specified)  Consult performed by: Petty Norman APRN  Consult ordered by: Behroozi, Saeid, MD        Subjective   Subjective     Reason for Consult/ Chief Complaint: ureter stone    History of Present Illness  Yasmeen Sandoval is a 22 y.o. female consulted to Urology for ureter stone, presented to the ED last night due to worsening RLQ pain that started 3 days ago, says she cam to the ED 2 days ago and was diagnosed with UTI. She went to class yesterday, and the pain intensified to the point that when she came home she was \"in the floor for 45 minutes\" due to the pain. She vomited last night, last ate 1-2 french fries late last night. TMax 103 now 98.1. Pain is controlled.   Abdominal Pain  This is a new problem. The current episode started in the past 7 days. The onset quality is sudden. The problem occurs constantly. The problem has been gradually worsening since onset. The pain is located in the RLQ. The pain is severe. The pain does not radiate. Associated symptoms include dysuria, a fever, nausea and vomiting. Nothing relieves the symptoms. Past treatments include antibiotics. The treatment provided no improvement relief. Significant past medical history includes a UTI.       Review of Systems   Constitutional: Positive for fever.   Gastrointestinal: Positive for abdominal pain, nausea and vomiting.   Genitourinary: Positive for dysuria.   All other systems reviewed and are negative.       Personal History     Past Medical History:   Diagnosis Date   • Tobacco abuse        Past Surgical History:   Procedure Laterality Date   • NO PAST SURGERIES         Family History: family history includes Hyperlipidemia in her paternal grandfather; Hypertension in her " maternal grandmother. Otherwise pertinent FHx was reviewed and not pertinent to current issue.    Social History:  reports that she has been smoking electronic cigarette. She has a 0.25 pack-year smoking history. She has quit using smokeless tobacco. She reports that she does not drink alcohol and does not use drugs.    Home Medications:   cephalexin, etonogestrel-ethinyl estradiol, metroNIDAZOLE, and silver sulfadiazine    Allergies:  No Known Allergies    Objective    Objective     Vitals:  Temp:  [98.1 °F (36.7 °C)-102.9 °F (39.4 °C)] 98.1 °F (36.7 °C)  Heart Rate:  [] 94  Resp:  [16-20] 16  BP: ()/(49-72) 94/53    Physical Exam  Vitals and nursing note reviewed.   Constitutional:       General: She is not in acute distress.     Appearance: She is ill-appearing.   HENT:      Head: Normocephalic and atraumatic.      Right Ear: External ear normal.      Left Ear: External ear normal.      Mouth/Throat:      Mouth: Mucous membranes are moist.   Eyes:      General: No scleral icterus.        Right eye: No discharge.         Left eye: No discharge.      Pupils: Pupils are equal, round, and reactive to light.   Cardiovascular:      Rate and Rhythm: Normal rate.   Pulmonary:      Effort: Pulmonary effort is normal. No respiratory distress.   Abdominal:      General: There is no distension.      Palpations: Abdomen is soft.      Tenderness: There is abdominal tenderness. There is no right CVA tenderness or left CVA tenderness.   Musculoskeletal:         General: No swelling, tenderness or signs of injury.   Skin:     General: Skin is warm and moist.      Capillary Refill: Capillary refill takes less than 2 seconds.      Coloration: Skin is pale.   Neurological:      General: No focal deficit present.      Mental Status: She is alert and oriented to person, place, and time. Mental status is at baseline.      GCS: GCS eye subscore is 4. GCS verbal subscore is 5. GCS motor subscore is 6.   Psychiatric:          Attention and Perception: Attention normal.         Mood and Affect: Mood normal.         Behavior: Behavior normal.         Thought Content: Thought content normal.         Result Review    Result Review:  I have personally reviewed the results from the time of this admission to 1/7/2023 09:00 CST and agree with these findings:  [x]  Laboratory list / accordion  [x]  Microbiology  [x]  Radiology  []  EKG/Telemetry   []  Cardiology/Vascular   []  Pathology  []  Old records  []  Other:  Most notable findings include:     Imaging Results (Last 48 Hours)     Procedure Component Value Units Date/Time    CT Abdomen Pelvis Without Contrast [[id number]] Collected: 01/06/23 2224     Updated: 01/06/23 9377    Narrative:      EXAM: CT Abdomen and Pelvis without contrast     INDICATION: uti, suprapubic, right flank pain, sepsis    TECHNIQUE: Helical CT of the abdomen and pelvis was obtained from  the diaphragm through the ischial tuberosities without contrast.  5 mm axial images were created as were coronal and sagittal  reformats.    Dose reduction techniques were used including automated exposure  control and/or adjustment of the mA and/or kV according to  patient size.    COMPARISON: Ultrasound from 1/5/2023    The lack of IV contrast significantly decreases the sensitivity  of this study for the evaluation of solid abdominal organs,  hollow viscera and vascular structures.    FINDINGS:  LUNG BASES: 3 mm right middle lobe pulmonary nodule. Right  basilar atelectasis.    STOMACH: No significant finding.    LIVER: No significant abnormality.     BILIARY: No significant abnormality.    PANCREAS: No significant abnormality.    SPLEEN: No significant abnormality.    ADRENAL GLANDS: No significant abnormality.    KIDNEYS/BLADDER:  Evaluation for pyelonephritis is limited  secondary to lack of IV contrast. Mild right perinephric and  periureteral stranding. 2 mm calculus in location the distal  right ureter. Minimal upstream  hydronephrosis. No left-sided  hydronephrosis or urinary tract calculi.    VESSELS: Nonaneurysmal abdominal aorta.    LYMPH NODES: No enlarged abdominal or pelvic lymph nodes.    OTHER PELVIC: No free pelvic fluid.    GI TRACT: No significant abnormality. Normal appendix.    ABDOMINAL WALL: No significant abnormality.    PERITONEUM: No ascites or pneumoperitoneum.     BONES/SPINE: No acute abnormality.        Impression:        2 mm calcification in the location of the distal right ureter,  concerning for ureteral calculus resulting in minimal upstream  hydronephrosis. There is also right periureteral/perinephric  stranding concerning for superimposed urinary tract infection.  Evaluation for pyelonephritis is limited secondary to lack of IV  contrast.    INCIDENTAL FINDINGS:    None requiring follow-up.    Electronically signed by:  Jorge Alberto Robb MD  1/6/2023 11:53 PM  CST Workstation: [id number]        Lab Results (last 48 hours)     Procedure Component Value Units Date/Time    TSH [[id number]]  (Normal) Collected: 01/07/23 0512    Specimen: Blood Updated: 01/07/23 0609     TSH 1.320 uIU/mL     Basic Metabolic Panel [[id number]]  (Abnormal) Collected: 01/07/23 0512    Specimen: Blood Updated: 01/07/23 0605     Glucose 95 mg/dL      BUN 11 mg/dL      Creatinine 0.80 mg/dL      Sodium 135 mmol/L      Potassium 4.0 mmol/L      Chloride 105 mmol/L      CO2 20.0 mmol/L      Calcium 7.3 mg/dL      BUN/Creatinine Ratio 13.8     Anion Gap 10.0 mmol/L      eGFR 107.7 mL/min/1.73      Comment: National Kidney Foundation and American Society of Nephrology (ASN) Task Force recommended calculation based on the Chronic Kidney Disease Epidemiology Collaboration (CKD-EPI) equation refit without adjustment for race.       Narrative:      GFR Normal >60  Chronic Kidney Disease <60  Kidney Failure <15      Magnesium [[id number]]  (Normal) Collected: 01/07/23 0512    Specimen: Blood Updated: 01/07/23 0605     Magnesium 1.7 mg/dL      CBC (No Diff) [[id number]]  (Abnormal) Collected: 01/07/23 0512    Specimen: Blood Updated: 01/07/23 0559     WBC 15.71 10*3/mm3      RBC 4.01 10*6/mm3      Hemoglobin 11.9 g/dL      Hematocrit 35.7 %      MCV 89.0 fL      MCH 29.7 pg      MCHC 33.3 g/dL      RDW 12.4 %      RDW-SD 40.4 fl      MPV 9.6 fL      Platelets 238 10*3/mm3     Hemoglobin A1c [[id number]]  (Normal) Collected: 01/07/23 0512    Specimen: Blood Updated: 01/07/23 0558     Hemoglobin A1C 4.80 %     Narrative:      Hemoglobin A1C Ranges:    Increased Risk for Diabetes  5.7% to 6.4%  Diabetes                     >= 6.5%  Diabetic Goal                < 7.0%    Urine Culture - Urine, Urine, Clean Catch [[id number]] Collected: 01/06/23 2303    Specimen: Urine, Clean Catch Updated: 01/07/23 0130    Urinalysis, Microscopic Only - Urine, Clean Catch [[id number]]  (Abnormal) Collected: 01/06/23 2303    Specimen: Urine, Clean Catch Updated: 01/06/23 2324     RBC, UA 0-2 /HPF      WBC, UA Too Numerous to Count /HPF      Bacteria, UA None Seen /HPF      Squamous Epithelial Cells, UA 3-5 /HPF      Hyaline Casts, UA 0-2 /LPF      Methodology Manual Light Microscopy    Urinalysis With Microscopic If Indicated (No Culture) - Urine, Clean Catch [[id number]]  (Abnormal) Collected: 01/06/23 2303    Specimen: Urine, Clean Catch Updated: 01/06/23 2324     Color, UA Yellow     Appearance, UA Clear     pH, UA 6.0     Specific [place], UA 1.003     Comment: Result obtained by Refractometer        Glucose, UA Negative     Ketones, UA 15 mg/dL (1+)     Bilirubin, UA Negative     Blood, UA Trace     Protein, UA Negative     Leuk Esterase, UA Large (3+)     Nitrite, UA Negative     Urobilinogen, UA 0.2 E.U./dL    Chlamydia trachomatis, Neisseria gonorrhoeae, Trichomonas vaginalis, PCR - Urine, Urine, Clean Catch [[id number]] Collected: 01/06/23 2303    Specimen: Urine, Clean Catch Updated: 01/06/23 2306    hCG, Serum, Qualitative [[id number]]  (Normal) Collected: 01/06/23 2026     Specimen: Blood Updated: 01/06/23 2214     HCG Qualitative Negative    Blood Culture - Blood, Arm, Right [[id number]] Collected: 01/06/23 2145    Specimen: Blood from Arm, Right Updated: 01/06/23 2208    Extra Tubes [[id number]] Collected: 01/06/23 2026    Specimen: Blood Updated: 01/06/23 2131    Narrative:      The following orders were created for panel order Extra Tubes.  Procedure                               Abnormality         Status                     ---------                               -----------         ------                     Gold Top - SST[[id number]]                                   Final result               Light Blue Top[[id number]]                                   Final result                 Please view results for these tests on the individual orders.    Gold Top - SST [[id number]] Collected: 01/06/23 2026    Specimen: Blood Updated: 01/06/23 2131     Extra Tube Hold for add-ons.     Comment: Auto resulted.       Light Blue Top [[id number]] Collected: 01/06/23 2026    Specimen: Blood Updated: 01/06/23 2131     Extra Tube Hold for add-ons.     Comment: Auto resulted       Comprehensive Metabolic Panel [[id number]]  (Abnormal) Collected: 01/06/23 2026    Specimen: Blood Updated: 01/06/23 2047     Glucose 96 mg/dL      BUN 11 mg/dL      Creatinine 0.91 mg/dL      Sodium 132 mmol/L      Potassium 3.8 mmol/L      Chloride 96 mmol/L      CO2 22.0 mmol/L      Calcium 9.0 mg/dL      Total Protein 7.7 g/dL      Albumin 4.2 g/dL      ALT (SGPT) 23 U/L      AST (SGOT) 19 U/L      Alkaline Phosphatase 48 U/L      Total Bilirubin 0.5 mg/dL      Globulin 3.5 gm/dL      A/G Ratio 1.2 g/dL      BUN/Creatinine Ratio 12.1     Anion Gap 14.0 mmol/L      eGFR 92.2 mL/min/1.73      Comment: National Kidney Foundation and American Society of Nephrology (ASN) Task Force recommended calculation based on the Chronic Kidney Disease Epidemiology Collaboration (CKD-EPI) equation refit without adjustment for race.        Narrative:      GFR Normal >60  Chronic Kidney Disease <60  Kidney Failure <15      Magnesium [[id number]]  (Normal) Collected: 01/06/23 2026    Specimen: Blood Updated: 01/06/23 2047     Magnesium 1.8 mg/dL     Lactic Acid, Plasma [[id number]]  (Normal) Collected: 01/06/23 2026    Specimen: Blood Updated: 01/06/23 2043     Lactate 0.9 mmol/L     CBC & Differential [[id number]]  (Abnormal) Collected: 01/06/23 2026    Specimen: Blood Updated: 01/06/23 2030    Narrative:      The following orders were created for panel order CBC & Differential.  Procedure                               Abnormality         Status                     ---------                               -----------         ------                     CBC Auto Differential[[id number]]        Abnormal            Final result                 Please view results for these tests on the individual orders.    CBC Auto Differential [[id number]]  (Abnormal) Collected: 01/06/23 2026    Specimen: Blood Updated: 01/06/23 2030     WBC 14.59 10*3/mm3      RBC 4.62 10*6/mm3      Hemoglobin 13.9 g/dL      Hematocrit 39.5 %      MCV 85.5 fL      MCH 30.1 pg      MCHC 35.2 g/dL      RDW 12.6 %      RDW-SD 38.7 fl      MPV 9.5 fL      Platelets 274 10*3/mm3      Neutrophil % 84.7 %      Lymphocyte % 7.9 %      Monocyte % 6.6 %      Eosinophil % 0.1 %      Basophil % 0.4 %      Immature Grans % 0.3 %      Neutrophils, Absolute 12.36 10*3/mm3      Lymphocytes, Absolute 1.15 10*3/mm3      Monocytes, Absolute 0.97 10*3/mm3      Eosinophils, Absolute 0.01 10*3/mm3      Basophils, Absolute 0.06 10*3/mm3      Immature Grans, Absolute 0.04 10*3/mm3      nRBC 0.0 /100 WBC     Blood Culture - Blood, Arm, Left [[id number]] Collected: 01/06/23 2026    Specimen: Blood from Arm, Left Updated: 01/06/23 2026            Assessment & Plan   Assessment / Plan     Brief Patient Summary:  Yasmeen Sandoval is a 22 y.o. female who has 2 mm distal right ureter stone with minimal hydronephrosis  but periureteral/perinephric stranding, sepsis from UTI. TMAX 103.     Active Hospital Problems:  Active Hospital Problems    Diagnosis    • **Sepsis (HCC)    • Sepsis without acute organ dysfunction, due to unspecified organism (HCC)      Plan:   NPO for CYSTOSCOPY RIGHT URETEROSCOPY RETROGRADE PYELOGRAM HOLMIUM LASER STENT INSERTION with DrANNY Lozano

## 2023-01-07 NOTE — ED NOTES
Nursing report ED to floor  Yasmeen Sandoval  22 y.o.  female    HPI:   Chief Complaint   Patient presents with    Fever    Nausea    Vomiting       Admitting doctor:   No admitting provider for patient encounter.    Consulting provider(s):  Consults       Date and Time Order Name Status Description    1/7/2023  1:00 AM Urology (on-call MD unless specified) Completed     1/7/2023  1:00 AM Hospitalist (on-call MD unless specified)               Admitting diagnosis:   The primary encounter diagnosis was Sepsis without acute organ dysfunction, due to unspecified organism (HCC). Diagnoses of Pyelonephritis and Ureterolithiasis were also pertinent to this visit.    Code status:   Current Code Status       Date Active Code Status Order ID Comments User Context       1/7/2023 0124 CPR (Attempt to Resuscitate) [id number]  Behroozi, Saeid, MD ED        Question Answer    Code Status (Patient has no pulse and is not breathing) CPR (Attempt to Resuscitate)    Medical Interventions (Patient has pulse or is breathing) Full Support    Level Of Support Discussed With Patient                    Allergies:   Patient has no known allergies.    Intake and Output    Intake/Output Summary (Last 24 hours) at 1/7/2023 1526  Last data filed at 1/7/2023 1046  Gross per 24 hour   Intake 3200 ml   Output 950 ml   Net 2250 ml       Weight:       01/06/23  1946   Weight: 59.9 kg (132 lb)       Most recent vitals:   Vitals:    01/07/23 0902 01/07/23 0956 01/07/23 1054 01/07/23 1455   BP: 112/55 99/55 94/63 107/57   BP Location:    Left arm   Patient Position:    Lying   Pulse: 110  85 100   Resp:   17 16   Temp:       TempSrc:       SpO2: 94%  94% 97%   Weight:       Height:         Oxygen Therapy: NA    Active LDAs/IV Access:   Lines, Drains & Airways       Active LDAs       Name Placement date Placement time Site Days    Peripheral IV 01/06/23 2027 Left Antecubital 01/06/23 2027  Antecubital  less than 1    Peripheral IV 01/06/23 8341  Left;Posterior Hand 01/06/23  2145  Hand  less than 1                    Labs (abnormal labs have a star):   Labs Reviewed   COMPREHENSIVE METABOLIC PANEL - Abnormal; Notable for the following components:       Result Value    Sodium 132 (*)     Chloride 96 (*)     All other components within normal limits    Narrative:     GFR Normal >60  Chronic Kidney Disease <60  Kidney Failure <15     URINALYSIS W/ MICROSCOPIC IF INDICATED (NO CULTURE) - Abnormal; Notable for the following components:    Ketones, UA 15 mg/dL (1+) (*)     Blood, UA Trace (*)     Leuk Esterase, UA Large (3+) (*)     All other components within normal limits   CBC WITH AUTO DIFFERENTIAL - Abnormal; Notable for the following components:    WBC 14.59 (*)     Neutrophil % 84.7 (*)     Lymphocyte % 7.9 (*)     Eosinophil % 0.1 (*)     Neutrophils, Absolute 12.36 (*)     Monocytes, Absolute 0.97 (*)     All other components within normal limits   URINALYSIS, MICROSCOPIC ONLY - Abnormal; Notable for the following components:    RBC, UA 0-2 (*)     WBC, UA Too Numerous to Count (*)     Squamous Epithelial Cells, UA 3-5 (*)     All other components within normal limits   BASIC METABOLIC PANEL - Abnormal; Notable for the following components:    Sodium 135 (*)     CO2 20.0 (*)     Calcium 7.3 (*)     All other components within normal limits    Narrative:     GFR Normal >60  Chronic Kidney Disease <60  Kidney Failure <15     CBC (NO DIFF) - Abnormal; Notable for the following components:    WBC 15.71 (*)     Hemoglobin 11.9 (*)     All other components within normal limits   CHLAMYDIA TRACHOMATIS, NEISSERIA GONORRHOEAE, TRICHOMONAS VAGINALIS, PCR - Normal   LACTIC ACID, PLASMA - Normal   MAGNESIUM - Normal   HCG, SERUM, QUALITATIVE - Normal   MAGNESIUM - Normal   TSH - Normal   HEMOGLOBIN A1C - Normal    Narrative:     Hemoglobin A1C Ranges:    Increased Risk for Diabetes  5.7% to 6.4%  Diabetes                     >= 6.5%  Diabetic Goal                <  7.0%   BLOOD CULTURE   BLOOD CULTURE   URINE CULTURE   CBC AND DIFFERENTIAL    Narrative:     The following orders were created for panel order CBC & Differential.  Procedure                               Abnormality         Status                     ---------                               -----------         ------                     CBC Auto Differential[[id number]]        Abnormal            Final result                 Please view results for these tests on the individual orders.   EXTRA TUBES    Narrative:     The following orders were created for panel order Extra Tubes.  Procedure                               Abnormality         Status                     ---------                               -----------         ------                     Gold Top - SST[[id number]]                                   Final result               Light Blue Top[[id number]]                                   Final result                 Please view results for these tests on the individual orders.   GOLD TOP - SST   LIGHT BLUE TOP       Meds given in ED:   Medications   sodium chloride 0.9 % flush 10 mL (has no administration in time range)   sodium chloride 0.9 % flush 10 mL ( Intravenous Canceled Entry 1/7/23 0849)   sodium chloride 0.9 % flush 10 mL (has no administration in time range)   sodium chloride 0.9 % infusion 40 mL (has no administration in time range)   acetaminophen (TYLENOL) tablet 650 mg (650 mg Oral Given 1/7/23 0621)   piperacillin-tazobactam (ZOSYN) 3.375 g/100 mL 0.9% NS IVPB (mbp) (0 g Intravenous Stopped 1/7/23 1453)   lactated ringers infusion (125 mL/hr Intravenous New Bag 1/7/23 1458)   ketorolac (TORADOL) injection 15 mg (has no administration in time range)   morphine injection 2 mg (2 mg Intravenous Given 1/7/23 1452)   HYDROcodone-acetaminophen (NORCO) 5-325 MG per tablet 1 tablet (has no administration in time range)   ondansetron (ZOFRAN) injection 4 mg (0 mg Intravenous Hold 1/7/23 1459)   sodium  chloride 0.9 % bolus 1,000 mL ( Intravenous Canceled Entry 1/6/23 2150)   ondansetron (ZOFRAN) injection 4 mg (4 mg Intravenous Given 1/6/23 2045)   ketorolac (TORADOL) injection 30 mg (30 mg Intravenous Given 1/6/23 2044)   cefTRIAXone (ROCEPHIN) 1 g/100 mL 0.9% NS (MBP) (0 g Intravenous Stopped 1/6/23 2215)   sodium chloride 0.9 % bolus 1,000 mL (0 mL Intravenous Stopped 1/7/23 0046)   tamsulosin (FLOMAX) 24 hr capsule 0.4 mg (0.4 mg Oral Given 1/7/23 0016)   piperacillin-tazobactam (ZOSYN) 3.375 g/100 mL 0.9% NS IVPB (mbp) (0 g Intravenous Stopped 1/7/23 0249)   sodium chloride 0.9 % bolus 1,000 mL (0 mL Intravenous Stopped 1/7/23 0232)     lactated ringers, 125 mL/hr, Last Rate: 125 mL/hr (01/07/23 1458)         NIH Stroke Scale:       Isolation/Infection(s):  No active isolations   No active infections     COVID Testing  Collected No  Resulted NA    Nursing report ED to floor:  Mental status: A & O X 4  Ambulatory status: self  Precautions: standard     ED nurse phone extentsion- [phone]

## 2023-01-07 NOTE — ED NOTES
Per lab, there was an error with the patient label on urine sample. The urine sample was thrown out and will have to be recollected.

## 2023-01-08 LAB
ANION GAP SERPL CALCULATED.3IONS-SCNC: 8 MMOL/L (ref 5–15)
BACTERIA SPEC AEROBE CULT: NO GROWTH
BASOPHILS # BLD AUTO: 0.04 10*3/MM3 (ref 0–0.2)
BASOPHILS NFR BLD AUTO: 0.3 % (ref 0–1.5)
BUN SERPL-MCNC: 6 MG/DL (ref 6–20)
BUN/CREAT SERPL: 8.1 (ref 7–25)
CALCIUM SPEC-SCNC: 8 MG/DL (ref 8.6–10.5)
CHLORIDE SERPL-SCNC: 104 MMOL/L (ref 98–107)
CO2 SERPL-SCNC: 23 MMOL/L (ref 22–29)
CREAT SERPL-MCNC: 0.74 MG/DL (ref 0.57–1)
DEPRECATED RDW RBC AUTO: 38.8 FL (ref 37–54)
EGFRCR SERPLBLD CKD-EPI 2021: 118.2 ML/MIN/1.73
EOSINOPHIL # BLD AUTO: 0.11 10*3/MM3 (ref 0–0.4)
EOSINOPHIL NFR BLD AUTO: 0.8 % (ref 0.3–6.2)
ERYTHROCYTE [DISTWIDTH] IN BLOOD BY AUTOMATED COUNT: 12.5 % (ref 12.3–15.4)
GLUCOSE SERPL-MCNC: 113 MG/DL (ref 65–99)
HCT VFR BLD AUTO: 33.4 % (ref 34–46.6)
HGB BLD-MCNC: 11.8 G/DL (ref 12–15.9)
IMM GRANULOCYTES # BLD AUTO: 0.05 10*3/MM3 (ref 0–0.05)
IMM GRANULOCYTES NFR BLD AUTO: 0.4 % (ref 0–0.5)
LYMPHOCYTES # BLD AUTO: 2.42 10*3/MM3 (ref 0.7–3.1)
LYMPHOCYTES NFR BLD AUTO: 18.3 % (ref 19.6–45.3)
MCH RBC QN AUTO: 30.2 PG (ref 26.6–33)
MCHC RBC AUTO-ENTMCNC: 35.3 G/DL (ref 31.5–35.7)
MCV RBC AUTO: 85.4 FL (ref 79–97)
MONOCYTES # BLD AUTO: 1.67 10*3/MM3 (ref 0.1–0.9)
MONOCYTES NFR BLD AUTO: 12.7 % (ref 5–12)
NEUTROPHILS NFR BLD AUTO: 67.5 % (ref 42.7–76)
NEUTROPHILS NFR BLD AUTO: 8.91 10*3/MM3 (ref 1.7–7)
NRBC BLD AUTO-RTO: 0 /100 WBC (ref 0–0.2)
PLATELET # BLD AUTO: 254 10*3/MM3 (ref 140–450)
PMV BLD AUTO: 9.9 FL (ref 6–12)
POTASSIUM SERPL-SCNC: 3.9 MMOL/L (ref 3.5–5.2)
RBC # BLD AUTO: 3.91 10*6/MM3 (ref 3.77–5.28)
SODIUM SERPL-SCNC: 135 MMOL/L (ref 136–145)
WBC NRBC COR # BLD: 13.2 10*3/MM3 (ref 3.4–10.8)

## 2023-01-08 PROCEDURE — 25010000002 ONDANSETRON PER 1 MG: Performed by: UROLOGY

## 2023-01-08 PROCEDURE — 36415 COLL VENOUS BLD VENIPUNCTURE: CPT | Performed by: UROLOGY

## 2023-01-08 PROCEDURE — 25010000002 PIPERACILLIN SOD-TAZOBACTAM PER 1 G: Performed by: INTERNAL MEDICINE

## 2023-01-08 PROCEDURE — 80048 BASIC METABOLIC PNL TOTAL CA: CPT | Performed by: UROLOGY

## 2023-01-08 PROCEDURE — 85025 COMPLETE CBC W/AUTO DIFF WBC: CPT | Performed by: UROLOGY

## 2023-01-08 RX ADMIN — PIPERACILLIN SODIUM AND TAZOBACTAM SODIUM 3.38 G: 3; .375 INJECTION, POWDER, LYOPHILIZED, FOR SOLUTION INTRAVENOUS at 01:28

## 2023-01-08 RX ADMIN — ONDANSETRON 4 MG: 2 INJECTION INTRAMUSCULAR; INTRAVENOUS at 17:04

## 2023-01-08 RX ADMIN — HYDROCODONE BITARTRATE AND ACETAMINOPHEN 1 TABLET: 5; 325 TABLET ORAL at 01:32

## 2023-01-08 RX ADMIN — PIPERACILLIN SODIUM AND TAZOBACTAM SODIUM 3.38 G: 3; .375 INJECTION, POWDER, LYOPHILIZED, FOR SOLUTION INTRAVENOUS at 09:19

## 2023-01-08 RX ADMIN — HYDROCODONE BITARTRATE AND ACETAMINOPHEN 1 TABLET: 5; 325 TABLET ORAL at 17:04

## 2023-01-08 RX ADMIN — HYDROCODONE BITARTRATE AND ACETAMINOPHEN 1 TABLET: 5; 325 TABLET ORAL at 23:36

## 2023-01-08 RX ADMIN — PIPERACILLIN SODIUM AND TAZOBACTAM SODIUM 3.38 G: 3; .375 INJECTION, POWDER, LYOPHILIZED, FOR SOLUTION INTRAVENOUS at 17:04

## 2023-01-08 RX ADMIN — TAMSULOSIN HYDROCHLORIDE 0.4 MG: 0.4 CAPSULE ORAL at 07:52

## 2023-01-08 RX ADMIN — ACETAMINOPHEN 650 MG: 325 TABLET ORAL at 07:54

## 2023-01-08 RX ADMIN — HYDROCODONE BITARTRATE AND ACETAMINOPHEN 1 TABLET: 5; 325 TABLET ORAL at 07:52

## 2023-01-08 NOTE — PROGRESS NOTES
[facility] Medicine Services  INPATIENT PROGRESS NOTE    Length of Stay: 1  Date of Admission: 1/6/2023  Primary Care Physician: Provider, No Known    Subjective   Chief Complaint: Right-sided lower abdominal pain  HPI:      22-year-old female with no significant past medical history presented to the hospital with complaints of right-sided lower pelvic pain along with nausea and vomiting and dysuria.  She was subsequently found to have 2 mm right-sided ureteral calculus with hydronephrosis.  She underwent cystoscopy with right retrograde ureteroscopy and J stent placement for the right distal ureteral obstruction.    Overnight, she did not have any acute events.  No active complaint this morning except slight pain.  Continuing on IV Zosyn.  Urine cultures are growing gram-negative rods.  Blood cultures remain negative.    Review of Systems     All pertinent negatives and positives are as above. All other systems have been reviewed and are negative unless otherwise stated.     Objective    Temp:  [97.2 °F (36.2 °C)-101.1 °F (38.4 °C)] 98.6 °F (37 °C)  Heart Rate:  [] 71  Resp:  [16-18] 18  BP: ()/(47-67) 109/56    Physical Exam  General: [Appears stated age, alert, oriented ×3, cooperative]  HEENT: [Normocephalic, atraumatic, EOMI, PERRLA, unremarkable external ear, moist mucous membranes, supple neck, no lymphadenopathy]  CVS: [RRR, S1, S2, no murmurs, normal peripheral pulses]  Respiratory: [CTA bilaterally, symmetrical expansion, no wheezing, no rales, no crackles]  Gastrointestinal: [Soft, nontender, nondistended, no organomegaly could be appreciated, normal bowel sounds]  Musculoskeletal: [Grossly normal, no tenderness, normal ROM]  Skin: [No rashes, no erythema, no lesions]  Extremities: [Normal inspection.  No edema, no cyanosis, no clubbing.  Normal capillary refill]  Neuro: [Alert, oriented ×3, grossly normal motor and sensory function]  Psychiatry: [No  anxiety, no depression, nonsuicidal]          Results Review:  I have reviewed the labs, radiology results, and diagnostic studies.    Laboratory Data:   Results from last 7 days   Lab Units 01/08/23 0723 01/07/23 0512 01/06/23 2026 01/05/23  1250   SODIUM mmol/L 135* 135* 132* 137   POTASSIUM mmol/L 3.9 4.0 3.8 4.2   CHLORIDE mmol/L 104 105 96* 103   CO2 mmol/L 23.0 20.0* 22.0 24.0   BUN mg/dL 6 11 11 9   CREATININE mg/dL 0.74 0.80 0.91 0.73   GLUCOSE mg/dL 113* 95 96 86   CALCIUM mg/dL 8.0* 7.3* 9.0 9.0   BILIRUBIN mg/dL  --   --  0.5 0.5   ALK PHOS U/L  --   --  48 43   ALT (SGPT) U/L  --   --  23 19   AST (SGOT) U/L  --   --  19 18   ANION GAP mmol/L 8.0 10.0 14.0 10.0     Estimated Creatinine Clearance: 110.3 mL/min (by C-G formula based on SCr of 0.74 mg/dL).  Results from last 7 days   Lab Units 01/07/23 0512 01/06/23 2026   MAGNESIUM mg/dL 1.7 1.8         Results from last 7 days   Lab Units 01/08/23 0723 01/07/23  0512 01/06/23 2026 01/05/23  1250   WBC 10*3/mm3 13.20* 15.71* 14.59* 14.43*   HEMOGLOBIN g/dL 11.8* 11.9* 13.9 14.4   HEMATOCRIT % 33.4* 35.7 39.5 43.5   PLATELETS 10*3/mm3 254 238 274 336           Culture Data:   Blood Culture   Date Value Ref Range Status   01/06/2023 No growth at 24 hours  Preliminary   01/06/2023 No growth at 24 hours  Preliminary     Urine Culture   Date Value Ref Range Status   01/06/2023 No growth  Final     No results found for: RESPCX  No results found for: WOUNDCX  No results found for: STOOLCX  No components found for: BODYFLD    Radiology Data:   Imaging Results (Last 24 Hours)     Procedure Component Value Units Date/Time    FL Retrograde Pyelogram In OR [[id number]] Resulted: 01/07/23 1622     Updated: 01/07/23 1647          I have reviewed the patient's current medications.     Assessment/Plan     Active Hospital Problems    Diagnosis    • **Sepsis (HCC)    • Sepsis without acute organ dysfunction, due to unspecified organism (HCC)    • Ureterolithiasis         Plan:        Complicated Urinary Tract Infection:  Continue IV Zosyn  S/p right retrograde ureteroscopy and J stent placement for right distal ureteral obstruction  Blood cultures negative thus far  Urine cultures growing gram-negative rods    Right ureteral stone:  With hydronephrosis  Management as above        Patient is full code  Estimated length of stay less than 2 midnights  DVT prophylaxis: SCDs          Medical Decision Making  Number and Complexity of problems: Moderate  Differential Diagnosis: None    Conditions and Status:        Condition is improving.     MDM Data  External documents reviewed: None  My EKG interpretation: No EKG done today  No radiology studies done today  Tests considered but not ordered: None     Decision rules/scores evaluated (example [id number], Wells, etc): None     Discussed with: Patient and the family     Treatment Plan  IV antibiotics, await finalization of blood cultures    Care Planning  Shared decision making: Discussed with the patient as well as the family  Code status and discussions: Yes    Disposition  Social Determinants of Health that impact treatment or disposition: None  I expect the patient to be discharged to home in less than 2 days.     Alayna Sandoval MD

## 2023-01-08 NOTE — NURSING NOTE
Patient took shower this shift, pain controlled with oral pain relievers, resting between care, vitals stable.

## 2023-01-08 NOTE — PROGRESS NOTES
"   LOS: 1 day   Patient Care Team:  Provider, No Known as PCP - General    Subjective     Subjective:  Symptoms:  Stable.    Diet:  Adequate intake.  No vomiting.    Pain:  She reports pain is improving.  Pain is well controlled and requiring pain medication.        History taken from: patient chart family RN    Objective     Vital Signs  Temp:  [97.2 °F (36.2 °C)-101.1 °F (38.4 °C)] 97.9 °F (36.6 °C)  Heart Rate:  [] 96  Resp:  [16-18] 16  BP: ()/(47-67) 102/50    Objective:  General Appearance:  In no acute distress.    Vital signs: (most recent): Blood pressure 102/50, pulse 96, temperature 97.9 °F (36.6 °C), temperature source Oral, resp. rate 16, height 160 cm (63\"), weight 66.7 kg (147 lb 1.6 oz), last menstrual period 01/02/2023, SpO2 96 %, not currently breastfeeding.  (TMAX 102.9--> 101.1).    Lungs:  Normal effort.    Abdomen: Abdomen is non-distended.  There is no abdominal tenderness.     Neurological: Patient is alert and oriented to person, place and time.    Pupils:  Pupils are equal, round, and reactive to light.    Skin:  Warm, dry and pale.              Results Review:    Lab Results (last 24 hours)     Procedure Component Value Units Date/Time    Basic Metabolic Panel [[id number]]  (Abnormal) Collected: 01/08/23 0723    Specimen: Blood Updated: 01/08/23 0754     Glucose 113 mg/dL      BUN 6 mg/dL      Creatinine 0.74 mg/dL      Sodium 135 mmol/L      Potassium 3.9 mmol/L      Chloride 104 mmol/L      CO2 23.0 mmol/L      Calcium 8.0 mg/dL      BUN/Creatinine Ratio 8.1     Anion Gap 8.0 mmol/L      eGFR 118.2 mL/min/1.73      Comment: National Kidney Foundation and American Society of Nephrology (ASN) Task Force recommended calculation based on the Chronic Kidney Disease Epidemiology Collaboration (CKD-EPI) equation refit without adjustment for race.       Narrative:      GFR Normal >60  Chronic Kidney Disease <60  Kidney Failure <15      CBC & Differential [[id number]]  (Abnormal) " Collected: 01/08/23 0723    Specimen: Blood Updated: 01/08/23 0744    Narrative:      The following orders were created for panel order CBC & Differential.  Procedure                               Abnormality         Status                     ---------                               -----------         ------                     CBC Auto Differential[[id number]]        Abnormal            Final result                 Please view results for these tests on the individual orders.    CBC Auto Differential [[id number]]  (Abnormal) Collected: 01/08/23 0723    Specimen: Blood Updated: 01/08/23 0744     WBC 13.20 10*3/mm3      RBC 3.91 10*6/mm3      Hemoglobin 11.8 g/dL      Hematocrit 33.4 %      MCV 85.4 fL      MCH 30.2 pg      MCHC 35.3 g/dL      RDW 12.5 %      RDW-SD 38.8 fl      MPV 9.9 fL      Platelets 254 10*3/mm3      Neutrophil % 67.5 %      Lymphocyte % 18.3 %      Monocyte % 12.7 %      Eosinophil % 0.8 %      Basophil % 0.3 %      Immature Grans % 0.4 %      Neutrophils, Absolute 8.91 10*3/mm3      Lymphocytes, Absolute 2.42 10*3/mm3      Monocytes, Absolute 1.67 10*3/mm3      Eosinophils, Absolute 0.11 10*3/mm3      Basophils, Absolute 0.04 10*3/mm3      Immature Grans, Absolute 0.05 10*3/mm3      nRBC 0.0 /100 WBC     Urine Culture - Urine, Urine, Clean Catch [[id number]]  (Normal) Collected: 01/06/23 2303    Specimen: Urine, Clean Catch Updated: 01/08/23 0741     Urine Culture No growth    Blood Culture - Blood, Arm, Right [[id number]]  (Normal) Collected: 01/06/23 2145    Specimen: Blood from Arm, Right Updated: 01/07/23 2215     Blood Culture No growth at 24 hours    Blood Culture - Blood, Arm, Left [[id number]]  (Normal) Collected: 01/06/23 2026    Specimen: Blood from Arm, Left Updated: 01/07/23 2031     Blood Culture No growth at 24 hours         Imaging Results (Last 24 Hours)     Procedure Component Value Units Date/Time    FL Retrograde Pyelogram In OR [[id number]] Resulted: 01/07/23 1622     Updated:  01/07/23 9795           I reviewed the patient's new clinical results.  I reviewed the patient's new imaging results and agree with the interpretation.  I reviewed the patient's other test results and agree with the interpretation      Assessment & Plan       Sepsis (HCC)    Sepsis without acute organ dysfunction, due to unspecified organism (HCC)    Ureterolithiasis      Assessment & Plan    POD 1 cystoscopy right retrograde ureteroscopy and J-stent placement for right distal ureteral obstruction. Pain reduced. Tolerating diet. TMAX 101.1. Cultures no growth, was taking antibiotic prior to admission.   Estimated Creatinine Clearance: 110.3 mL/min (by C-G formula based on SCr of 0.74 mg/dL).    Plan:  Stent in place, treating infection.   Discussed with patient Urology will clear for discharge when she is afebrile 24 hours.     ANNY Alexis  01/08/23  10:16 CST

## 2023-01-09 PROCEDURE — 25010000002 CEFTRIAXONE PER 250 MG: Performed by: PHYSICIAN ASSISTANT

## 2023-01-09 PROCEDURE — 25010000002 PIPERACILLIN SOD-TAZOBACTAM PER 1 G: Performed by: INTERNAL MEDICINE

## 2023-01-09 PROCEDURE — 25010000002 ONDANSETRON PER 1 MG: Performed by: UROLOGY

## 2023-01-09 RX ORDER — SODIUM CHLORIDE 9 MG/ML
125 INJECTION, SOLUTION INTRAVENOUS CONTINUOUS
Status: DISCONTINUED | OUTPATIENT
Start: 2023-01-09 | End: 2023-01-10 | Stop reason: HOSPADM

## 2023-01-09 RX ORDER — BISACODYL 5 MG/1
10 TABLET, DELAYED RELEASE ORAL DAILY PRN
Status: DISCONTINUED | OUTPATIENT
Start: 2023-01-09 | End: 2023-01-10 | Stop reason: HOSPADM

## 2023-01-09 RX ADMIN — ONDANSETRON 4 MG: 2 INJECTION INTRAMUSCULAR; INTRAVENOUS at 21:55

## 2023-01-09 RX ADMIN — TAMSULOSIN HYDROCHLORIDE 0.4 MG: 0.4 CAPSULE ORAL at 08:00

## 2023-01-09 RX ADMIN — HYDROCODONE BITARTRATE AND ACETAMINOPHEN 1 TABLET: 5; 325 TABLET ORAL at 21:51

## 2023-01-09 RX ADMIN — HYDROCODONE BITARTRATE AND ACETAMINOPHEN 1 TABLET: 5; 325 TABLET ORAL at 07:54

## 2023-01-09 RX ADMIN — SODIUM CHLORIDE 125 ML/HR: 9 INJECTION, SOLUTION INTRAVENOUS at 10:24

## 2023-01-09 RX ADMIN — CEFTRIAXONE SODIUM 1 G: 1 INJECTION, POWDER, FOR SOLUTION INTRAMUSCULAR; INTRAVENOUS at 10:26

## 2023-01-09 RX ADMIN — Medication 10 ML: at 21:57

## 2023-01-09 RX ADMIN — PIPERACILLIN SODIUM AND TAZOBACTAM SODIUM 3.38 G: 3; .375 INJECTION, POWDER, LYOPHILIZED, FOR SOLUTION INTRAVENOUS at 01:16

## 2023-01-09 NOTE — PLAN OF CARE
Goal Outcome Evaluation:              Outcome Evaluation: meds given as orderd, walking in room, monitored vitals,abt continues

## 2023-01-09 NOTE — PROGRESS NOTES
[facility] Medicine Services  INPATIENT PROGRESS NOTE    Length of Stay: 2  Date of Admission: 1/6/2023  Primary Care Physician: Provider, No Known    Subjective   Chief Complaint: Right-sided lower abdominal pain  HPI:      22-year-old female with no significant past medical history presented to the hospital with complaints of right-sided lower pelvic pain along with nausea and vomiting and dysuria.  She was subsequently found to have 2 mm right-sided ureteral calculus with hydronephrosis.  She underwent cystoscopy with right retrograde ureteroscopy and J stent placement for the right distal ureteral obstruction.    Overnight patient did well.  She has no new complaints.  She did have low-grade fevers through the night and this morning.    Review of Systems     All pertinent negatives and positives are as above. All other systems have been reviewed and are negative unless otherwise stated.     Objective    Temp:  [98.4 °F (36.9 °C)-99.3 °F (37.4 °C)] 99.1 °F (37.3 °C)  Heart Rate:  [] 94  Resp:  [18] 18  BP: (104-118)/(56-67) 118/56    Physical Exam  General: [Appears stated age, alert, oriented ×3, cooperative]  HEENT: [Normocephalic, atraumatic, EOMI, PERRLA, unremarkable external ear, moist mucous membranes, supple neck, no lymphadenopathy]  CVS: [RRR, S1, S2, no murmurs, normal peripheral pulses]  Respiratory: [CTA bilaterally, symmetrical expansion, no wheezing, no rales, no crackles]  Gastrointestinal: [Soft, nontender, nondistended, no organomegaly could be appreciated, normal bowel sounds]  Musculoskeletal: [Grossly normal, no tenderness, normal ROM]  Skin: [No rashes, no erythema, no lesions]  Extremities: [Normal inspection.  No edema, no cyanosis, no clubbing.  Normal capillary refill]  Neuro: [Alert, oriented ×3, grossly normal motor and sensory function]  Psychiatry: [No anxiety, no depression, nonsuicidal]          Results Review:  I have reviewed the  How Severe Is Your Skin Lesion?: mild Has Your Skin Lesion Been Treated?: not been treated labs, radiology results, and diagnostic studies.    Laboratory Data:   Results from last 7 days   Lab Units 01/08/23  0723 01/07/23  0512 01/06/23 2026 01/05/23  1250   SODIUM mmol/L 135* 135* 132* 137   POTASSIUM mmol/L 3.9 4.0 3.8 4.2   CHLORIDE mmol/L 104 105 96* 103   CO2 mmol/L 23.0 20.0* 22.0 24.0   BUN mg/dL 6 11 11 9   CREATININE mg/dL 0.74 0.80 0.91 0.73   GLUCOSE mg/dL 113* 95 96 86   CALCIUM mg/dL 8.0* 7.3* 9.0 9.0   BILIRUBIN mg/dL  --   --  0.5 0.5   ALK PHOS U/L  --   --  48 43   ALT (SGPT) U/L  --   --  23 19   AST (SGOT) U/L  --   --  19 18   ANION GAP mmol/L 8.0 10.0 14.0 10.0     Estimated Creatinine Clearance: 110.9 mL/min (by C-G formula based on SCr of 0.74 mg/dL).  Results from last 7 days   Lab Units 01/07/23 0512 01/06/23 2026   MAGNESIUM mg/dL 1.7 1.8         Results from last 7 days   Lab Units 01/08/23 0723 01/07/23  0512 01/06/23 2026 01/05/23  1250   WBC 10*3/mm3 13.20* 15.71* 14.59* 14.43*   HEMOGLOBIN g/dL 11.8* 11.9* 13.9 14.4   HEMATOCRIT % 33.4* 35.7 39.5 43.5   PLATELETS 10*3/mm3 254 238 274 336           Culture Data:   Blood Culture   Date Value Ref Range Status   01/06/2023 No growth at 2 days  Preliminary   01/06/2023 No growth at 2 days  Preliminary     Urine Culture   Date Value Ref Range Status   01/06/2023 No growth  Final     No results found for: RESPCX  No results found for: WOUNDCX  No results found for: STOOLCX  No components found for: BODYFLD    Radiology Data:   Imaging Results (Last 24 Hours)     Procedure Component Value Units Date/Time    FL Retrograde Pyelogram In OR [[id number]] Resulted: 01/09/23 0836     Updated: 01/09/23 0836          I have reviewed the patient's current medications.     Assessment/Plan     Active Hospital Problems    Diagnosis    • **Sepsis (HCC)    • Sepsis without acute organ dysfunction, due to unspecified organism (HCC)    • Ureterolithiasis        Plan:        Complicated Urinary Tract Infection:  IV Zosyn de-escalated to Rocephin  Is This A New Presentation, Or A Follow-Up?: Skin Lesion based on sensitivities  S/p right retrograde ureteroscopy and J stent placement for right distal ureteral obstruction  Blood cultures negative thus far      Right ureteral stone:  With hydronephrosis  Management as above        Patient is full code  Estimated length of stay less than 2 midnights  DVT prophylaxis: SCDs          Medical Decision Making  Number and Complexity of problems: Moderate  Differential Diagnosis: None    Conditions and Status:        Condition is improving.     [facility] Data  External documents reviewed: None  My EKG interpretation: No EKG done today  No radiology studies done today  Tests considered but not ordered: None     Decision rules/scores evaluated (example [id number], Wells, etc): None     Discussed with: Patient and the family     Treatment Plan  IV antibiotics, await finalization of blood cultures    Care Planning  Shared decision making: Discussed with the patient as well as the family  Code status and discussions: Yes    Disposition  Social Determinants of Health that impact treatment or disposition: None  I expect the patient to be discharged to home in less than 2 days.     Matilda Rai PA-C   Additional History: Pain level: 0/10

## 2023-01-09 NOTE — PROGRESS NOTES
"   LOS: 2 days   Patient Care Team:  Provider, No Known as PCP - General    Subjective     Subjective:  Symptoms:  Stable.  She reports malaise and weakness.  (Feels tired and weak, although better than when she was admitted. Some discomfort with urination. Worried about returning to work when she feels unwell. ).    Activity level: Impaired due to weakness.    Pain:  She reports pain is improving.  Pain is requiring pain medication.        History taken from: patient chart    Objective     Vital Signs  Temp:  [97.8 °F (36.6 °C)-99.3 °F (37.4 °C)] 97.8 °F (36.6 °C)  Heart Rate:  [] 81  Resp:  [18] 18  BP: (104-118)/(55-67) 114/63    Objective:  General Appearance:  In no acute distress.    Vital signs: (most recent): Blood pressure 114/63, pulse 81, temperature 97.8 °F (36.6 °C), temperature source Temporal, resp. rate 18, height 160 cm (63\"), weight 67.5 kg (148 lb 14.4 oz), last menstrual period 01/02/2023, SpO2 97 %, not currently breastfeeding.  Vital signs are normal.  No fever.    Output: Producing urine.    Lungs:  Normal effort and normal respiratory rate.    Neurological: Patient is alert and oriented to person, place and time.    Pupils:  Pupils are equal, round, and reactive to light.    Skin:  Warm and dry.              Results Review:    Lab Results (last 24 hours)     Procedure Component Value Units Date/Time    Blood Culture - Blood, Arm, Right [[id number]]  (Normal) Collected: 01/06/23 2145    Specimen: Blood from Arm, Right Updated: 01/08/23 2215     Blood Culture No growth at 2 days    Blood Culture - Blood, Arm, Left [[id number]]  (Normal) Collected: 01/06/23 2026    Specimen: Blood from Arm, Left Updated: 01/08/23 2031     Blood Culture No growth at 2 days         Imaging Results (Last 24 Hours)     Procedure Component Value Units Date/Time    FL Retrograde Pyelogram In OR [[id number]] Resulted: 01/09/23 0836     Updated: 01/09/23 0836           I reviewed the patient's new clinical " results.  I reviewed the patient's new imaging results and agree with the interpretation.  I reviewed the patient's other test results and agree with the interpretation      Assessment & Plan       Sepsis (HCC)    Sepsis without acute organ dysfunction, due to unspecified organism (HCC)    Ureterolithiasis      Assessment & Plan    Consulted to Urology for right distal ureteral obstruction, POD 2 right J-stent placement, TMax 102.9-->101.1-->99.3, cultures no growth this admission, 1/5/23 urine culture E.Coli resistant to Bactrim and ampicillin.   Estimated Creatinine Clearance: 110.9 mL/min (by C-G formula based on SCr of 0.74 mg/dL).    Plan:  J-stent in place, treating UTI.     Plan KUB renal ultrasound 3-4 days outpatient, advised patient she should remain off work until she is seen by myself/Dr. Gutierrez in office later this week and clears her to return to work.     ANNY Alexis  01/09/23  14:57 CST

## 2023-01-10 ENCOUNTER — READMISSION MANAGEMENT (OUTPATIENT)
Dept: CALL CENTER | Facility: HOSPITAL | Age: 23
End: 2023-01-10
Payer: COMMERCIAL

## 2023-01-10 VITALS
WEIGHT: 135 LBS | RESPIRATION RATE: 20 BRPM | DIASTOLIC BLOOD PRESSURE: 53 MMHG | TEMPERATURE: 98.2 F | HEART RATE: 83 BPM | SYSTOLIC BLOOD PRESSURE: 107 MMHG | BODY MASS INDEX: 23.92 KG/M2 | OXYGEN SATURATION: 97 % | HEIGHT: 63 IN

## 2023-01-10 PROCEDURE — 25010000002 ONDANSETRON PER 1 MG: Performed by: UROLOGY

## 2023-01-10 RX ORDER — CEPHALEXIN 500 MG/1
500 CAPSULE ORAL 2 TIMES DAILY
Qty: 8 CAPSULE | Refills: 0 | Status: SHIPPED | OUTPATIENT
Start: 2023-01-10 | End: 2023-01-14

## 2023-01-10 RX ORDER — KETOROLAC TROMETHAMINE 10 MG/1
10 TABLET, FILM COATED ORAL EVERY 6 HOURS PRN
Qty: 20 TABLET | Refills: 0 | Status: SHIPPED | OUTPATIENT
Start: 2023-01-10 | End: 2023-01-26

## 2023-01-10 RX ORDER — OXYBUTYNIN CHLORIDE 5 MG/1
5 TABLET ORAL 2 TIMES DAILY PRN
Status: DISCONTINUED | OUTPATIENT
Start: 2023-01-10 | End: 2023-01-10 | Stop reason: HOSPADM

## 2023-01-10 RX ORDER — OXYBUTYNIN CHLORIDE 5 MG/1
5 TABLET ORAL 2 TIMES DAILY PRN
Qty: 20 TABLET | Refills: 0 | Status: SHIPPED | OUTPATIENT
Start: 2023-01-10 | End: 2023-01-26

## 2023-01-10 RX ADMIN — SODIUM CHLORIDE 125 ML/HR: 9 INJECTION, SOLUTION INTRAVENOUS at 00:50

## 2023-01-10 RX ADMIN — TAMSULOSIN HYDROCHLORIDE 0.4 MG: 0.4 CAPSULE ORAL at 08:25

## 2023-01-10 RX ADMIN — HYDROCODONE BITARTRATE AND ACETAMINOPHEN 1 TABLET: 5; 325 TABLET ORAL at 07:51

## 2023-01-10 RX ADMIN — ONDANSETRON 4 MG: 2 INJECTION INTRAMUSCULAR; INTRAVENOUS at 07:51

## 2023-01-10 NOTE — DISCHARGE SUMMARY
[facility] Medicine Services  DISCHARGE SUMMARY       Date of Admission: 1/6/2023  Date of Discharge:  1/10/2023  Primary Care Physician: Provider, No Known    Presenting Problem/History of Present Illness:  Ureterolithiasis [N20.1]  Pyelonephritis [N12]  Sepsis (HCC) [A41.9]  Sepsis without acute organ dysfunction, due to unspecified organism (HCC) [A41.9]       Final Discharge Diagnoses:  Active Hospital Problems    Diagnosis    • **Sepsis (HCC)    • Sepsis without acute organ dysfunction, due to unspecified organism (HCC)    • Ureterolithiasis        Consults:   Consults     Date and Time Order Name Status Description    1/7/2023  1:00 AM Urology (on-call MD unless specified) Completed     1/7/2023  1:00 AM Hospitalist (on-call MD unless specified)            Procedures Performed: Procedure(s):  CYSTOSCOPY RIGHT URETEROSCOPY RETROGRADE PYELOGRAM HOLMIUM STENT INSERTION                Pertinent Test Results:   Lab Results (most recent)     Procedure Component Value Units Date/Time    Blood Culture - Blood, Arm, Right [[id number]]  (Normal) Collected: 01/06/23 2145    Specimen: Blood from Arm, Right Updated: 01/09/23 2215     Blood Culture No growth at 3 days    Blood Culture - Blood, Arm, Left [[id number]]  (Normal) Collected: 01/06/23 2026    Specimen: Blood from Arm, Left Updated: 01/09/23 2031     Blood Culture No growth at 3 days    Basic Metabolic Panel [[id number]]  (Abnormal) Collected: 01/08/23 0723    Specimen: Blood Updated: 01/08/23 0754     Glucose 113 mg/dL      BUN 6 mg/dL      Creatinine 0.74 mg/dL      Sodium 135 mmol/L      Potassium 3.9 mmol/L      Chloride 104 mmol/L      CO2 23.0 mmol/L      Calcium 8.0 mg/dL      BUN/Creatinine Ratio 8.1     Anion Gap 8.0 mmol/L      eGFR 118.2 mL/min/1.73      Comment: National Kidney Foundation and American Society of Nephrology (ASN) Task Force recommended calculation based on the Chronic Kidney Disease  Epidemiology Collaboration (CKD-EPI) equation refit without adjustment for race.       Narrative:      GFR Normal >60  Chronic Kidney Disease <60  Kidney Failure <15      CBC & Differential [[id number]]  (Abnormal) Collected: 01/08/23 0723    Specimen: Blood Updated: 01/08/23 0744    Narrative:      The following orders were created for panel order CBC & Differential.  Procedure                               Abnormality         Status                     ---------                               -----------         ------                     CBC Auto Differential[[id number]]        Abnormal            Final result                 Please view results for these tests on the individual orders.    CBC Auto Differential [[id number]]  (Abnormal) Collected: 01/08/23 0723    Specimen: Blood Updated: 01/08/23 0744     WBC 13.20 10*3/mm3      RBC 3.91 10*6/mm3      Hemoglobin 11.8 g/dL      Hematocrit 33.4 %      MCV 85.4 fL      MCH 30.2 pg      MCHC 35.3 g/dL      RDW 12.5 %      RDW-SD 38.8 fl      MPV 9.9 fL      Platelets 254 10*3/mm3      Neutrophil % 67.5 %      Lymphocyte % 18.3 %      Monocyte % 12.7 %      Eosinophil % 0.8 %      Basophil % 0.3 %      Immature Grans % 0.4 %      Neutrophils, Absolute 8.91 10*3/mm3      Lymphocytes, Absolute 2.42 10*3/mm3      Monocytes, Absolute 1.67 10*3/mm3      Eosinophils, Absolute 0.11 10*3/mm3      Basophils, Absolute 0.04 10*3/mm3      Immature Grans, Absolute 0.05 10*3/mm3      nRBC 0.0 /100 WBC     Urine Culture - Urine, Urine, Clean Catch [[id number]]  (Normal) Collected: 01/06/23 2303    Specimen: Urine, Clean Catch Updated: 01/08/23 0741     Urine Culture No growth    Chlamydia trachomatis, Neisseria gonorrhoeae, Trichomonas vaginalis, PCR - Urine, Urine, Clean Catch [[id number]]  (Normal) Collected: 01/06/23 2303    Specimen: Urine, Clean Catch Updated: 01/07/23 1008     Chlamydia DNA by PCR Negative     Neisseria gonorrhoeae by PCR Negative     Trichomonas vaginalis PCR  Negative    TSH [[id number]]  (Normal) Collected: 01/07/23 0512    Specimen: Blood Updated: 01/07/23 0609     TSH 1.320 uIU/mL     Basic Metabolic Panel [[id number]]  (Abnormal) Collected: 01/07/23 0512    Specimen: Blood Updated: 01/07/23 0605     Glucose 95 mg/dL      BUN 11 mg/dL      Creatinine 0.80 mg/dL      Sodium 135 mmol/L      Potassium 4.0 mmol/L      Chloride 105 mmol/L      CO2 20.0 mmol/L      Calcium 7.3 mg/dL      BUN/Creatinine Ratio 13.8     Anion Gap 10.0 mmol/L      eGFR 107.7 mL/min/1.73      Comment: National Kidney Foundation and American Society of Nephrology (ASN) Task Force recommended calculation based on the Chronic Kidney Disease Epidemiology Collaboration (CKD-EPI) equation refit without adjustment for race.       Narrative:      GFR Normal >60  Chronic Kidney Disease <60  Kidney Failure <15      Magnesium [[id number]]  (Normal) Collected: 01/07/23 0512    Specimen: Blood Updated: 01/07/23 0605     Magnesium 1.7 mg/dL     CBC (No Diff) [[id number]]  (Abnormal) Collected: 01/07/23 0512    Specimen: Blood Updated: 01/07/23 0559     WBC 15.71 10*3/mm3      RBC 4.01 10*6/mm3      Hemoglobin 11.9 g/dL      Hematocrit 35.7 %      MCV 89.0 fL      MCH 29.7 pg      MCHC 33.3 g/dL      RDW 12.4 %      RDW-SD 40.4 fl      MPV 9.6 fL      Platelets 238 10*3/mm3     Hemoglobin A1c [[id number]]  (Normal) Collected: 01/07/23 0512    Specimen: Blood Updated: 01/07/23 0558     Hemoglobin A1C 4.80 %     Narrative:      Hemoglobin A1C Ranges:    Increased Risk for Diabetes  5.7% to 6.4%  Diabetes                     >= 6.5%  Diabetic Goal                < 7.0%    Urinalysis, Microscopic Only - Urine, Clean Catch [[id number]]  (Abnormal) Collected: 01/06/23 2303    Specimen: Urine, Clean Catch Updated: 01/06/23 2324     RBC, UA 0-2 /HPF      WBC, UA Too Numerous to Count /HPF      Bacteria, UA None Seen /HPF      Squamous Epithelial Cells, UA 3-5 /HPF      Hyaline Casts, UA 0-2 /LPF      Methodology Manual  Light Microscopy    Urinalysis With Microscopic If Indicated (No Culture) - Urine, Clean Catch [[id number]]  (Abnormal) Collected: 01/06/23 2303    Specimen: Urine, Clean Catch Updated: 01/06/23 2324     Color, UA Yellow     Appearance, UA Clear     pH, UA 6.0     Specific [place], UA 1.003     Comment: Result obtained by Refractometer        Glucose, UA Negative     Ketones, UA 15 mg/dL (1+)     Bilirubin, UA Negative     Blood, UA Trace     Protein, UA Negative     Leuk Esterase, UA Large (3+)     Nitrite, UA Negative     Urobilinogen, UA 0.2 E.U./dL    hCG, Serum, Qualitative [[id number]]  (Normal) Collected: 01/06/23 2026    Specimen: Blood Updated: 01/06/23 2214     HCG Qualitative Negative    Extra Tubes [[id number]] Collected: 01/06/23 2026    Specimen: Blood Updated: 01/06/23 2131    Narrative:      The following orders were created for panel order Extra Tubes.  Procedure                               Abnormality         Status                     ---------                               -----------         ------                     Gold Top - SST[[id number]]                                   Final result               Light Blue Top[[id number]]                                   Final result                 Please view results for these tests on the individual orders.    Gold Top - SST [[id number]] Collected: 01/06/23 2026    Specimen: Blood Updated: 01/06/23 2131     Extra Tube Hold for add-ons.     Comment: Auto resulted.       Light Blue Top [[id number]] Collected: 01/06/23 2026    Specimen: Blood Updated: 01/06/23 2131     Extra Tube Hold for add-ons.     Comment: Auto resulted       Comprehensive Metabolic Panel [[id number]]  (Abnormal) Collected: 01/06/23 2026    Specimen: Blood Updated: 01/06/23 2047     Glucose 96 mg/dL      BUN 11 mg/dL      Creatinine 0.91 mg/dL      Sodium 132 mmol/L      Potassium 3.8 mmol/L      Chloride 96 mmol/L      CO2 22.0 mmol/L      Calcium 9.0 mg/dL      Total Protein 7.7 g/dL       Albumin 4.2 g/dL      ALT (SGPT) 23 U/L      AST (SGOT) 19 U/L      Alkaline Phosphatase 48 U/L      Total Bilirubin 0.5 mg/dL      Globulin 3.5 gm/dL      A/G Ratio 1.2 g/dL      BUN/Creatinine Ratio 12.1     Anion Gap 14.0 mmol/L      eGFR 92.2 mL/min/1.73      Comment: National Kidney Foundation and American Society of Nephrology (ASN) Task Force recommended calculation based on the Chronic Kidney Disease Epidemiology Collaboration (CKD-EPI) equation refit without adjustment for race.       Narrative:      GFR Normal >60  Chronic Kidney Disease <60  Kidney Failure <15      Magnesium [[id number]]  (Normal) Collected: 01/06/23 2026    Specimen: Blood Updated: 01/06/23 2047     Magnesium 1.8 mg/dL     Lactic Acid, Plasma [[id number]]  (Normal) Collected: 01/06/23 2026    Specimen: Blood Updated: 01/06/23 2043     Lactate 0.9 mmol/L     CBC & Differential [[id number]]  (Abnormal) Collected: 01/06/23 2026    Specimen: Blood Updated: 01/06/23 2030    Narrative:      The following orders were created for panel order CBC & Differential.  Procedure                               Abnormality         Status                     ---------                               -----------         ------                     CBC Auto Differential[[id number]]        Abnormal            Final result                 Please view results for these tests on the individual orders.    CBC Auto Differential [[id number]]  (Abnormal) Collected: 01/06/23 2026    Specimen: Blood Updated: 01/06/23 2030     WBC 14.59 10*3/mm3      RBC 4.62 10*6/mm3      Hemoglobin 13.9 g/dL      Hematocrit 39.5 %      MCV 85.5 fL      MCH 30.1 pg      MCHC 35.2 g/dL      RDW 12.6 %      RDW-SD 38.7 fl      MPV 9.5 fL      Platelets 274 10*3/mm3      Neutrophil % 84.7 %      Lymphocyte % 7.9 %      Monocyte % 6.6 %      Eosinophil % 0.1 %      Basophil % 0.4 %      Immature Grans % 0.3 %      Neutrophils, Absolute 12.36 10*3/mm3      Lymphocytes, Absolute 1.15 10*3/mm3       Monocytes, Absolute 0.97 10*3/mm3      Eosinophils, Absolute 0.01 10*3/mm3      Basophils, Absolute 0.06 10*3/mm3      Immature Grans, Absolute 0.04 10*3/mm3      nRBC 0.0 /100 WBC         Imaging Results (Most Recent)     Procedure Component Value Units Date/Time    FL Retrograde Pyelogram In OR [[id number]] Resulted: 01/09/23 0836     Updated: 01/09/23 0836    CT Abdomen Pelvis Without Contrast [[id number]] Collected: 01/06/23 2224     Updated: 01/06/23 2355    Narrative:      EXAM: CT Abdomen and Pelvis without contrast     INDICATION: uti, suprapubic, right flank pain, sepsis    TECHNIQUE: Helical CT of the abdomen and pelvis was obtained from  the diaphragm through the ischial tuberosities without contrast.  5 mm axial images were created as were coronal and sagittal  reformats.    Dose reduction techniques were used including automated exposure  control and/or adjustment of the mA and/or kV according to  patient size.    COMPARISON: Ultrasound from 1/5/2023    The lack of IV contrast significantly decreases the sensitivity  of this study for the evaluation of solid abdominal organs,  hollow viscera and vascular structures.    FINDINGS:  LUNG BASES: 3 mm right middle lobe pulmonary nodule. Right  basilar atelectasis.    STOMACH: No significant finding.    LIVER: No significant abnormality.     BILIARY: No significant abnormality.    PANCREAS: No significant abnormality.    SPLEEN: No significant abnormality.    ADRENAL GLANDS: No significant abnormality.    KIDNEYS/BLADDER:  Evaluation for pyelonephritis is limited  secondary to lack of IV contrast. Mild right perinephric and  periureteral stranding. 2 mm calculus in location the distal  right ureter. Minimal upstream hydronephrosis. No left-sided  hydronephrosis or urinary tract calculi.    VESSELS: Nonaneurysmal abdominal aorta.    LYMPH NODES: No enlarged abdominal or pelvic lymph nodes.    OTHER PELVIC: No free pelvic fluid.    GI TRACT: No significant  "abnormality. Normal appendix.    ABDOMINAL WALL: No significant abnormality.    PERITONEUM: No ascites or pneumoperitoneum.     BONES/SPINE: No acute abnormality.        Impression:        2 mm calcification in the location of the distal right ureter,  concerning for ureteral calculus resulting in minimal upstream  hydronephrosis. There is also right periureteral/perinephric  stranding concerning for superimposed urinary tract infection.  Evaluation for pyelonephritis is limited secondary to lack of IV  contrast.    INCIDENTAL FINDINGS:    None requiring follow-up.    Electronically signed by:  Jorge Alberto Robb MD  1/6/2023 11:53 PM  CST Workstation: [id number]          Chief Complaint on Day of Discharge: none    Hospital Course:  The patient is a 22 y.o. female with no significant past medical history presented to the hospital with complaints of right-sided lower pelvic pain along with nausea and vomiting and dysuria.  She was subsequently found to have 2 mm right-sided ureteral calculus with hydronephrosis.  She underwent cystoscopy with right retrograde ureteroscopy and J stent placement for the right distal ureteral obstruction..      Urine culture showed sensitivity to Rocephin so she was transitioned from Zosyn to Rocephin in on to Keflex on discharge.  Close follow-up with urology in 3 to 4 days for renal ultrasound and in a few days for follow-up with Dr. Gutierrez.    Condition on Discharge: Stable, improved    Physical Exam on Discharge:  /53 (BP Location: Right arm, Patient Position: Lying)   Pulse 83   Temp 98.2 °F (36.8 °C) (Tympanic)   Resp 20   Ht 160 cm (63\")   Wt 61.2 kg (135 lb)   LMP 01/02/2023 (Approximate)   SpO2 97%   BMI 23.91 kg/m²     Vitals and nursing note reviewed.   Constitutional:       General: No acute distress.     Appearance: Normal appearance.   HENT:      Head: Normocephalic and atraumatic.      Right Ear: External ear normal.      Left Ear: External ear normal.      " Nose: Nose normal.      Mouth/Throat:      Mouth: Mucous membranes are moist.   Eyes:      Conjunctivae/sclerae: Conjunctivae normal.      Pupils: Pupils are equal, round, and reactive to light.   Cardiovascular:      Rate and Rhythm: Normal rate and regular rhythm.   Pulmonary:      Effort: Pulmonary effort is normal.      Breath sounds: Normal breath sounds.   Abdominal:      General: Abdomen is flat.      Palpations: Abdomen is soft.      Tenderness: There is no abdominal tenderness.   Musculoskeletal:         General: No signs of injury. Normal range of motion.      Cervical back: No tenderness.   Skin:     General: Skin is warm and dry.   Neurological:      General: No focal deficit present.      Mental Status: Alert and oriented to person, place, and time.   Psychiatric:         Mood and Affect: Mood normal.         Behavior: Behavior normal.         Discharge Medications:     Discharge Medications      New Medications      Instructions Start Date   ketorolac 10 MG tablet  Commonly known as: TORADOL   10 mg, Oral, Every 6 Hours PRN      oxybutynin 5 MG tablet  Commonly known as: DITROPAN   5 mg, Oral, 2 Times Daily PRN         Continue These Medications      Instructions Start Date   cephalexin 500 MG capsule  Commonly known as: Keflex   500 mg, Oral, 2 Times Daily      etonogestrel-ethinyl estradiol 0.12-0.015 MG/24HR vaginal ring  Commonly known as: NuvaRing   Insert vaginally and leave in place for 3 consecutive weeks, then remove for 1 week.      metroNIDAZOLE 0.75 % vaginal gel  Commonly known as: METROGEL   Vaginal, 2 Times Daily      silver sulfadiazine 1 % cream  Commonly known as: SILVADENE, SSD   Topical, 2 Times Daily             Discharge Diet:   Diet Instructions     Diet: Regular      Discharge Diet: Regular          Activity at Discharge:   Activity Instructions     Activity as Tolerated            Discharge Care Plan/Instructions:   Complicated Urinary Tract Infection:  IV Zosyn de-escalated to  Rocephin based on sensitivities  S/p right retrograde ureteroscopy and J stent placement for right distal ureteral obstruction  Blood cultures sensitivities for Rocephin, Keflex on discharge, close follow-up with urology       Right ureteral stone:  With hydronephrosis  Management as above           Follow-up Appointments:   Future Appointments   Date Time Provider Department Center   1/23/2023  1:00 PM Simone Hutchinson APRN [facility]       Test Results Pending at Discharge:   Pending Labs     Order Current Status    Blood Culture - Blood, Arm, Left Preliminary result    Blood Culture - Blood, Arm, Right Preliminary result            Copied text in this note has been reviewed and is accurate as of 1/10/2023     Time: 33 minutes

## 2023-01-10 NOTE — PROGRESS NOTES
"   LOS: 3 days   Patient Care Team:  Provider, No Known as PCP - General    Subjective     Subjective:  Symptoms:  Improved.  (Some bladder spasm with urination).    Diet:  Adequate intake.  No nausea or vomiting.    Pain:  She reports pain is improving.  Pain is well controlled.        History taken from: patient chart    Objective     Vital Signs  Temp:  [97.8 °F (36.6 °C)-98.4 °F (36.9 °C)] 98.2 °F (36.8 °C)  Heart Rate:  [67-83] 83  Resp:  [18-20] 20  BP: (100-117)/(53-64) 107/53    Objective:  General Appearance:  In no acute distress.    Vital signs: (most recent): Blood pressure 107/53, pulse 83, temperature 98.2 °F (36.8 °C), temperature source Tympanic, resp. rate 20, height 160 cm (63\"), weight 61.2 kg (135 lb), last menstrual period 01/02/2023, SpO2 97 %, not currently breastfeeding.  Vital signs are normal.  No fever.    Output: Producing urine.    Lungs:  Normal effort and normal respiratory rate.    Neurological: Patient is alert and oriented to person, place and time.    Pupils:  Pupils are equal, round, and reactive to light.    Skin:  Warm and dry.              Results Review:    Lab Results (last 24 hours)     Procedure Component Value Units Date/Time    Blood Culture - Blood, Arm, Right [[id number]]  (Normal) Collected: 01/06/23 2145    Specimen: Blood from Arm, Right Updated: 01/09/23 2215     Blood Culture No growth at 3 days    Blood Culture - Blood, Arm, Left [[id number]]  (Normal) Collected: 01/06/23 2026    Specimen: Blood from Arm, Left Updated: 01/09/23 2031     Blood Culture No growth at 3 days         Imaging Results (Last 24 Hours)     ** No results found for the last 24 hours. **           I reviewed the patient's new clinical results.  I reviewed the patient's new imaging results and agree with the interpretation.  I reviewed the patient's other test results and agree with the interpretation      Assessment & Plan       Sepsis (HCC)    Sepsis without acute organ dysfunction, due to " unspecified organism (HCC)    Ureterolithiasis      Assessment & Plan    Consulted to Urology for right distal ureter obstruction and had right J-stent placement on 1/7/23, TMAX 102.9-->101.1-->99.3-->98.4, 1/5/23 urine culture E.Coli, some pain with urination. Adequate renal function.     Plan:  J-stent in place, cleared for discharge from Urology standpoint, oral antibiotic upon discharge. Oxybutynin PRN bladder spasm. Follow up 3-5 days Urology outpatient.     ANNY Alexis  01/10/23  11:22 CST

## 2023-01-11 LAB
BACTERIA SPEC AEROBE CULT: NORMAL
BACTERIA SPEC AEROBE CULT: NORMAL

## 2023-01-11 NOTE — PROGRESS NOTES
Enter Query Response Below      Query Response:     Hyponatremia-clinically significant    Electronically signed by Matilda Rai PA-C, 23, 4:02 PM CST.          If applicable, please update the problem list.        Patient: Yasmeen Sandoval        : 2000  Account: [id number]           Admit Date: 23        How to Respond to this query:       a. Click New Note     b. Answer query within the yellow box.                c. Update the Problem List, if applicable.      If you have any questions about this query contact me at: [email or web address]     KATT Rai,     23 y/o noted with Sepsis, Urinary Tract Infection, Asymptomatic Hypotension, and mild Hyponatremia. On admit, sodium 132. On  sodium improved to 135. Patient treated with monitoring and IV fluids.     After study, can the Hyponatremia be specified as:     >>Hyponatremia-clinically significant  >>Hyponatremia-clinically insignificant  >>Other (please specify)_______  >>Unable to determine    By submitting this query, we are merely seeking further clarification of documentation to accurately reflect all conditions that you are monitoring, evaluating, treating or that extend the hospitalization or utilize additional resources of care. Please utilize your independent clinical judgment when addressing the question(s) above.     This query and your response, once completed, will be entered into the legal medical record.    Sincerely,  Alexandra Day RN CCDS   Clinical Documentation Integrity Program

## 2023-01-11 NOTE — OUTREACH NOTE
Prep Survey    Flowsheet Row Responses   [facility] facility patient discharged from? [place]   Is LACE score < 7 ? No   Eligibility Readm Mgmt   Discharge diagnosis **Sepsis (   Does the patient have one of the following disease processes/diagnoses(primary or secondary)? Sepsis   Does the patient have Home health ordered? No   Is there a DME ordered? No   Prep survey completed? Yes          CHRISTINE DRUMMOND - Registered Nurse

## 2023-01-12 ENCOUNTER — READMISSION MANAGEMENT (OUTPATIENT)
Dept: CALL CENTER | Facility: HOSPITAL | Age: 23
End: 2023-01-12
Payer: COMMERCIAL

## 2023-01-12 ENCOUNTER — NURSE TRIAGE (OUTPATIENT)
Dept: CALL CENTER | Facility: HOSPITAL | Age: 23
End: 2023-01-12
Payer: COMMERCIAL

## 2023-01-12 NOTE — OUTREACH NOTE
Sepsis Week 1 Survey    Flowsheet Row Responses   [facility] patient discharged from? [place]   Does the patient have one of the following disease processes/diagnoses(primary or secondary)? Sepsis   Week 1 attempt successful? Yes   Call start time 0838   Call end time 0841   Discharge diagnosis **Sepsis (   Meds reviewed with patient/caregiver? Yes   Is the patient having any side effects they believe may be caused by any medication additions or changes? No   Does the patient have all medications related to this admission filled (includes all antibiotics, inhalers, nebulizers,steroids,etc.) Yes   Is the patient taking all medications as directed (includes completed medication regime)? Yes   Does the patient have a primary care provider?  Yes   Comments regarding PCP New pt with Dr. Hutchinson on 01/23/22   Does the patient have an appointment with their PCP within 7 days of discharge? Yes   Has the patient kept scheduled appointments due by today? N/A   Comments Dr Gutierrez appt to be scheduled.   Has home health visited the patient within 72 hours of discharge? N/A   Psychosocial issues? No   Did the patient receive a copy of their discharge instructions? Yes   Nursing interventions Reviewed instructions with patient   What is the patient's perception of their health status since discharge? Improving   Nursing interventions Nurse provided patient education   Is the patient/caregiver able to teach back TIME? T emperature - higher or lower than normal, I nfection - may have signs and symptoms of an infection, M ental Decline - confused, sleepy, difficult to arouse, E xtremely Ill - severe pain, discomfort, shortness of breath   Nursing interventions Nurse provided patient education   Is patient/caregiver able to teach back steps to recovery at home? Set small, achievable goals for return to baseline health, Rest and regain strength, Talk about feelings with family/friends, Make a list of questions for PCP  appoinment, Eat a balanced diet   Is the patient/caregiver able to teach back signs and symptoms of worsening condition: Fever, Edema, Shortness of breath/rapid respiratory rate   If the patient is a current smoker, are they able to teach back resources for cessation? Not a smoker   Is the patient/caregiver able to teach back the hierarchy of who to call/visit for symptoms/problems? PCP, Specialist, Home health nurse, Urgent Care, ED, 911 Yes   Additional teach back comments She is feeling some better, says she over did it a bit yesterday.   Week 1 call completed? Yes   Wrap up additional comments No questions or issues at this time.          AKBAR JAIME - Registered Nurse

## 2023-01-12 NOTE — TELEPHONE ENCOUNTER
"She was discharged on 01/10/23 from [facility]- She has a 99.2 fever. She had sepsis-  She has a fever. She will call Dr. Gutierrez - She is passing red blood clots, and has stents.  [phone]    Reason for Disposition  • [1] Caller has URGENT question AND [2] triager unable to answer question    Additional Information  • Negative: Sounds like a life-threatening emergency to the triager  • Negative: Chest pain  • Negative: Difficulty breathing  • Negative: Acting confused (e.g., disoriented, slurred speech) or excessively sleepy  • Negative: Surgical incision symptoms and questions  • Negative: [1] Discomfort (pain, burning or stinging) when passing urine AND [2] male  • Negative: [1] Discomfort (pain, burning or stinging) when passing urine AND [2] female  • Negative: Constipation  • Negative: New or worsening leg (calf, thigh) pain  • Negative: New or worsening leg swelling  • Negative: Dizziness is severe, or persists > 24 hours after surgery  • Negative: Pain, redness, swelling, or pus at IV Site  • Negative: Symptoms arising from use of a urinary catheter (Mao or Coude)  • Negative: Cast problems or questions  • Negative: Medication question  • Negative: [1] Widespread rash AND [2] bright red, sunburn-like  • Negative: [1] SEVERE headache AND [2] after spinal (epidural) anesthesia  • Negative: [1] Vomiting AND [2] persists > 4 hours  • Negative: [1] Vomiting AND [2] abdomen looks much more swollen than usual  • Negative: [1] Drinking very little AND [2] dehydration suspected (e.g., no urine > 12 hours, very dry mouth, very lightheaded)  • Negative: Patient sounds very sick or weak to the triager  • Negative: Sounds like a serious complication to the triager  • Negative: Fever > 100.4 F (38.0 C)  • Negative: [1] SEVERE post-op pain (e.g., excruciating, pain scale 8-10) AND [2] not controlled with pain medications    Answer Assessment - Initial Assessment Questions  1. SYMPTOM: \"What's the main symptom you're " "concerned about?\" (e.g., pain, fever, vomiting)      *No Answer*fever   2. ONSET: \"When did *No Answer*  start?\"      *No Answer*today  3. SURGERY: \"What surgery was performed?\"      Cystoscopy with stents.   4. DATE of SURGERY: \"When was surgery performed?\"       01/07/23  5. ANESTHESIA: \" What type of anesthesia did you have?\" (e.g., general, spinal, epidural, local)      General   6. PAIN: \"Is there any pain?\" If Yes, ask: \"How bad is it?\"  (Scale 1-10; or mild, moderate, severe)      Pain   7. FEVER: \"Do you have a fever?\" If Yes, ask: \"What is your temperature, how was it measured, and when did it start?\"      Fever 99.2   8. VOMITING: \"Is there any vomiting?\" If yes, ask: \"How many times?\"      none  9. BLEEDING: \"Is there any bleeding?\" If Yes, ask: \"How much?\" and \"Where?\"      Passing clots.   10. OTHER SYMPTOMS: \"Do you have any other symptoms?\" (e.g., drainage from wound, painful urination, constipation)        Painful urination and had stents placed.    Protocols used: POST-OP SYMPTOMS AND QUESTIONS-ADULT-      "

## 2023-01-20 ENCOUNTER — READMISSION MANAGEMENT (OUTPATIENT)
Dept: CALL CENTER | Facility: HOSPITAL | Age: 23
End: 2023-01-20
Payer: COMMERCIAL

## 2023-01-20 NOTE — OUTREACH NOTE
Sepsis Week 2 Survey    Flowsheet Row Responses   [facility] patient discharged from? [place]   Does the patient have one of the following disease processes/diagnoses(primary or secondary)? Sepsis   Week 2 attempt successful? Yes   Call start time 1235   Call end time 1237   Discharge diagnosis Sepsis    Meds reviewed with patient/caregiver? Yes   Is the patient having any side effects they believe may be caused by any medication additions or changes? No   Does the patient have all medications related to this admission filled (includes all antibiotics, inhalers, nebulizers,steroids,etc.) Yes   Is the patient taking all medications as directed (includes completed medication regime)? Yes   Does the patient have a primary care provider?  Yes   Comments regarding PCP 1/26/23 PCP apt    Has the patient kept scheduled appointments due by today? N/A   Has home health visited the patient within 72 hours of discharge? N/A   Psychosocial issues? No   What is the patient's perception of their health status since discharge? Improving   Is the patient/caregiver able to teach back TIME? T emperature - higher or lower than normal, I nfection - may have signs and symptoms of an infection, M ental Decline - confused, sleepy, difficult to arouse, E xtremely Ill - severe pain, discomfort, shortness of breath   Is patient/caregiver able to teach back steps to recovery at home? Set small, achievable goals for return to baseline health, Rest and regain strength   Is the patient/caregiver able to teach back signs and symptoms of worsening condition: Fever, Rapid heart rate (>90), Hyperthermia   Is the patient/caregiver able to teach back the hierarchy of who to call/visit for symptoms/problems? PCP, Specialist, Home health nurse, Urgent Care, ED, 911 Yes   Week 2 call completed? Yes   Graduated/Revoked comments Pt reports no issues or concerns           SONYA H - Registered Nurse

## 2023-01-26 ENCOUNTER — OFFICE VISIT (OUTPATIENT)
Dept: FAMILY MEDICINE CLINIC | Facility: CLINIC | Age: 23
End: 2023-01-26
Payer: COMMERCIAL

## 2023-01-26 ENCOUNTER — LAB (OUTPATIENT)
Dept: LAB | Facility: HOSPITAL | Age: 23
End: 2023-01-26
Payer: COMMERCIAL

## 2023-01-26 VITALS
WEIGHT: 138.9 LBS | OXYGEN SATURATION: 99 % | HEIGHT: 63 IN | HEART RATE: 87 BPM | TEMPERATURE: 98.6 F | DIASTOLIC BLOOD PRESSURE: 72 MMHG | SYSTOLIC BLOOD PRESSURE: 102 MMHG | RESPIRATION RATE: 20 BRPM | BODY MASS INDEX: 24.61 KG/M2

## 2023-01-26 DIAGNOSIS — N20.1 URETEROLITHIASIS: ICD-10-CM

## 2023-01-26 DIAGNOSIS — Z00.00 ANNUAL PHYSICAL EXAM: ICD-10-CM

## 2023-01-26 DIAGNOSIS — A41.9 SEPSIS WITHOUT ACUTE ORGAN DYSFUNCTION, DUE TO UNSPECIFIED ORGANISM: ICD-10-CM

## 2023-01-26 DIAGNOSIS — R11.10 RECURRENT VOMITING: ICD-10-CM

## 2023-01-26 DIAGNOSIS — Z11.59 NEED FOR HEPATITIS C SCREENING TEST: ICD-10-CM

## 2023-01-26 DIAGNOSIS — Z76.89 ENCOUNTER TO ESTABLISH CARE: Primary | ICD-10-CM

## 2023-01-26 DIAGNOSIS — N12 PYELONEPHRITIS: ICD-10-CM

## 2023-01-26 DIAGNOSIS — Z09 HOSPITAL DISCHARGE FOLLOW-UP: ICD-10-CM

## 2023-01-26 PROCEDURE — 86003 ALLG SPEC IGE CRUDE XTRC EA: CPT

## 2023-01-26 PROCEDURE — 86258 DGP ANTIBODY EACH IG CLASS: CPT

## 2023-01-26 PROCEDURE — 80048 BASIC METABOLIC PNL TOTAL CA: CPT

## 2023-01-26 PROCEDURE — 86803 HEPATITIS C AB TEST: CPT

## 2023-01-26 PROCEDURE — 85025 COMPLETE CBC W/AUTO DIFF WBC: CPT

## 2023-01-26 PROCEDURE — 36415 COLL VENOUS BLD VENIPUNCTURE: CPT

## 2023-01-26 PROCEDURE — 99204 OFFICE O/P NEW MOD 45 MIN: CPT | Performed by: NURSE PRACTITIONER

## 2023-01-26 PROCEDURE — 86364 TISS TRNSGLTMNASE EA IG CLAS: CPT

## 2023-01-26 PROCEDURE — 81003 URINALYSIS AUTO W/O SCOPE: CPT

## 2023-01-26 RX ORDER — DOXYCYCLINE HYCLATE 100 MG/1
100 CAPSULE ORAL DAILY
COMMUNITY
Start: 2023-01-24 | End: 2023-03-25

## 2023-01-26 NOTE — PROGRESS NOTES
Subjective   Yasmeen Sandoval is a 22 y.o. female.     History of Present Illness  CC: Establish care/hospital follow-up/annual exam- pyelonephritis, nephrolithiasis, sepsis, recurrent vomiting  Urinary Tract Infection   This is a new problem. The current episode started 1 to 4 weeks ago. The problem has been resolved. The quality of the pain is described as aching. The pain is mild. There has been no fever (fever has resovled). There is a history of pyelonephritis. Associated symptoms include vomiting (episodic,recurrent vomiting). Pertinent negatives include no chills, frequency, hematuria, nausea, possible pregnancy, sweats or urgency. Flank pain: resolved. She has tried increased fluids and antibiotics for the symptoms. The treatment provided significant relief. Her past medical history is significant for kidney stones and a urological procedure.   Vomiting   This is a recurrent problem. The current episode started more than 1 month ago. The problem occurs less than 2 times per day (2-3 times weekly). The problem has been unchanged. The emesis has an appearance of stomach contents. There has been no fever. Associated symptoms include abdominal pain (generalized cramping). Pertinent negatives include no arthralgias, chest pain, chills, coughing, diarrhea, dizziness, fever, headaches, myalgias, sweats, URI or weight loss. She has tried diet change for the symptoms. The treatment provided no relief.        The following portions of the patient's history were reviewed and updated as appropriate: allergies, current medications, past family history, past medical history, past social history, past surgical history and problem list.    Review of Systems   Constitutional: Negative for activity change, appetite change, chills, diaphoresis, fatigue, fever, unexpected weight gain and unexpected weight loss.   HENT: Negative for congestion, sore throat, trouble swallowing and voice change.    Eyes: Negative for blurred  vision, double vision, photophobia, pain and visual disturbance.   Respiratory: Negative for cough, chest tightness, shortness of breath and wheezing.    Cardiovascular: Negative for chest pain, palpitations and leg swelling.   Gastrointestinal: Positive for abdominal pain (generalized cramping) and vomiting (episodic,recurrent vomiting). Negative for abdominal distention, anal bleeding, blood in stool, constipation, diarrhea, nausea, GERD and indigestion.   Endocrine: Negative for cold intolerance, heat intolerance, polydipsia, polyphagia and polyuria.   Genitourinary: Negative for dysuria, frequency, hematuria and urgency. Flank pain: resolved.   Musculoskeletal: Negative for arthralgias and myalgias.   Skin: Negative for rash.   Allergic/Immunologic: Negative.    Neurological: Negative for dizziness, syncope, weakness, light-headedness and headache.   Hematological: Negative.    Psychiatric/Behavioral: The patient is not nervous/anxious.        Objective   Physical Exam  Vitals and nursing note reviewed.   Constitutional:       General: She is not in acute distress.     Appearance: Normal appearance. She is well-developed and normal weight. She is not ill-appearing, toxic-appearing or diaphoretic.   HENT:      Head: Normocephalic and atraumatic.      Right Ear: External ear normal.      Left Ear: External ear normal.      Nose: Nose normal.   Eyes:      Conjunctiva/sclera: Conjunctivae normal.      Pupils: Pupils are equal, round, and reactive to light.   Neck:      Thyroid: No thyromegaly.      Trachea: No tracheal deviation.   Cardiovascular:      Rate and Rhythm: Normal rate and regular rhythm.      Heart sounds: Normal heart sounds. No murmur heard.    No friction rub. No gallop.   Pulmonary:      Effort: Pulmonary effort is normal. No respiratory distress.      Breath sounds: Normal breath sounds. No stridor. No wheezing, rhonchi or rales.   Abdominal:      General: Bowel sounds are normal. There is no  distension.      Palpations: Abdomen is soft. There is no mass.      Tenderness: There is generalized abdominal tenderness. There is no right CVA tenderness, left CVA tenderness, guarding or rebound.      Hernia: No hernia is present.      Comments: Normoactive bowel sounds.  Mild generalized abdominal pain mainly to the upper quadrants with increased tenderness epigastric/right upper quadrant.  No palpable masses   Musculoskeletal:         General: No tenderness. Normal range of motion.      Cervical back: Normal range of motion and neck supple.   Lymphadenopathy:      Cervical: No cervical adenopathy.   Skin:     General: Skin is warm and dry.      Coloration: Skin is not pale.      Findings: No erythema or rash.   Neurological:      Mental Status: She is alert and oriented to person, place, and time.      Cranial Nerves: No cranial nerve deficit.      Coordination: Coordination normal.   Psychiatric:         Mood and Affect: Mood normal.         Behavior: Behavior normal.         Thought Content: Thought content normal.         Judgment: Judgment normal.           Assessment & Plan   Diagnoses and all orders for this visit:    1. Encounter to establish care (Primary)    2. Annual physical exam  -     CBC & Differential; Future  -     Basic Metabolic Panel; Future  -     Urinalysis With Culture If Indicated -; Future  -     Hepatitis C Antibody; Future, will call with results    3. Hospital discharge follow-up    4. Sepsis without acute organ dysfunction, due to unspecified organism (HCC)  -     CBC & Differential; Future  -     Basic Metabolic Panel; Future  -     Urinalysis With Culture If Indicated -; Future, will call with results.  Currently asymptomatic.  Patient has subsequently had urology follow-up with stent removal.  No CVA tenderness on exam.  Patient currently on doxycycline until urology follow-up.  Continue medication as prescribed.  We will continue to monitor.    5. Ureterolithiasis  -     CBC &  Differential; Future  -     Basic Metabolic Panel; Future  -     Urinalysis With Culture If Indicated -; Future, will call with results.  Plan of care stated above #4    6. Pyelonephritis  -     CBC & Differential; Future  -     Basic Metabolic Panel; Future  -     Urinalysis With Culture If Indicated -; Future, will call with results.  Plan of care stated above #4.    7. Need for hepatitis C screening test  -     Hepatitis C Antibody; Future, will call with results    8. Recurrent vomiting  -     Recurrent Gastrointestinal Distress; Future, will call with results.  Patient reports having longstanding vomiting that occurs 2-3 times a week.  She has been unable to isolate a cause as it does not appear to have a food trigger.  We will obtain GI distress panel and further plan of care will depend on those results.  If GI panel returns normal will consider referral to GI.  Patient verbalized understanding of instruction agrees plan of care.    9.  Follow-up in 1 year for routine annual exam or sooner for any acute needs.            This document has been electronically signed by ANNY Lai on January 26, 2023 14:58 CST

## 2023-01-27 LAB
ANION GAP SERPL CALCULATED.3IONS-SCNC: 9.2 MMOL/L (ref 5–15)
BASOPHILS # BLD AUTO: 0.09 10*3/MM3 (ref 0–0.2)
BASOPHILS NFR BLD AUTO: 1.1 % (ref 0–1.5)
BILIRUB UR QL STRIP: NEGATIVE
BUN SERPL-MCNC: 12 MG/DL (ref 6–20)
BUN/CREAT SERPL: 14.6 (ref 7–25)
CALCIUM SPEC-SCNC: 9.6 MG/DL (ref 8.6–10.5)
CHLORIDE SERPL-SCNC: 106 MMOL/L (ref 98–107)
CLARITY UR: ABNORMAL
CO2 SERPL-SCNC: 24.8 MMOL/L (ref 22–29)
COLOR UR: YELLOW
CREAT SERPL-MCNC: 0.82 MG/DL (ref 0.57–1)
DEPRECATED RDW RBC AUTO: 40.3 FL (ref 37–54)
EGFRCR SERPLBLD CKD-EPI 2021: 104.5 ML/MIN/1.73
EOSINOPHIL # BLD AUTO: 0.5 10*3/MM3 (ref 0–0.4)
EOSINOPHIL NFR BLD AUTO: 5.9 % (ref 0.3–6.2)
ERYTHROCYTE [DISTWIDTH] IN BLOOD BY AUTOMATED COUNT: 12.6 % (ref 12.3–15.4)
GLUCOSE SERPL-MCNC: 69 MG/DL (ref 65–99)
GLUCOSE UR STRIP-MCNC: NEGATIVE MG/DL
HCT VFR BLD AUTO: 40 % (ref 34–46.6)
HCV AB SER DONR QL: NORMAL
HGB BLD-MCNC: 13.2 G/DL (ref 12–15.9)
HGB UR QL STRIP.AUTO: NEGATIVE
IMM GRANULOCYTES # BLD AUTO: 0.03 10*3/MM3 (ref 0–0.05)
IMM GRANULOCYTES NFR BLD AUTO: 0.4 % (ref 0–0.5)
KETONES UR QL STRIP: NEGATIVE
LEUKOCYTE ESTERASE UR QL STRIP.AUTO: NEGATIVE
LYMPHOCYTES # BLD AUTO: 3.3 10*3/MM3 (ref 0.7–3.1)
LYMPHOCYTES NFR BLD AUTO: 39.1 % (ref 19.6–45.3)
MCH RBC QN AUTO: 29.2 PG (ref 26.6–33)
MCHC RBC AUTO-ENTMCNC: 33 G/DL (ref 31.5–35.7)
MCV RBC AUTO: 88.5 FL (ref 79–97)
MONOCYTES # BLD AUTO: 0.55 10*3/MM3 (ref 0.1–0.9)
MONOCYTES NFR BLD AUTO: 6.5 % (ref 5–12)
NEUTROPHILS NFR BLD AUTO: 3.97 10*3/MM3 (ref 1.7–7)
NEUTROPHILS NFR BLD AUTO: 47 % (ref 42.7–76)
NITRITE UR QL STRIP: NEGATIVE
NRBC BLD AUTO-RTO: 0 /100 WBC (ref 0–0.2)
PH UR STRIP.AUTO: 6 [PH] (ref 5–8)
PLATELET # BLD AUTO: 496 10*3/MM3 (ref 140–450)
PMV BLD AUTO: 10.5 FL (ref 6–12)
POTASSIUM SERPL-SCNC: 4.2 MMOL/L (ref 3.5–5.2)
PROT UR QL STRIP: NEGATIVE
RBC # BLD AUTO: 4.52 10*6/MM3 (ref 3.77–5.28)
SODIUM SERPL-SCNC: 140 MMOL/L (ref 136–145)
SP GR UR STRIP: 1.02 (ref 1–1.03)
UROBILINOGEN UR QL STRIP: ABNORMAL
WBC NRBC COR # BLD: 8.44 10*3/MM3 (ref 3.4–10.8)

## 2023-01-27 NOTE — PROGRESS NOTES
UA has returned to mostly within normal limits other than some cloudiness.  Increase water intake.  CBC has returned within normal limits.  GI distress panel pending.  We will call with results.  Follow-up with urology as scheduled.

## 2023-01-28 LAB
GLIADIN PEPTIDE IGA SER-ACNC: 5 UNITS (ref 0–19)
GLIADIN PEPTIDE IGG SER-ACNC: 3 UNITS (ref 0–19)
TTG IGA SER-ACNC: <2 U/ML (ref 0–3)
TTG IGG SER-ACNC: <2 U/ML (ref 0–5)

## 2023-02-03 DIAGNOSIS — R11.10 RECURRENT VOMITING: Primary | ICD-10-CM

## 2023-02-03 LAB
CODFISH IGE QN: <0.1 KU/L
CONV CLASS DESCRIPTION: NORMAL
COW MILK IGE QN: <0.1 KU/L
EGG WHITE IGE QN: <0.1 KU/L
GLUTEN IGE QN: <0.1 KU/L
HAZELNUT IGE QN: <0.1 KU/L
PEANUT IGE QN: <0.1 KU/L
SCALLOP IGE QN: <0.1 KU/L
SESAME SEED IGE QN: <0.1 KU/L
SHRIMP IGE QN: <0.1 KU/L
SOYBEAN IGE QN: <0.1 KU/L
WALNUT IGE QN: <0.1 KU/L
WHEAT IGE QN: <0.1 KU/L

## 2023-02-03 NOTE — PROGRESS NOTES
GI distress panel unremarkable.  Referral placed to GI for further evaluation of recurrent vomiting.

## 2023-03-23 ENCOUNTER — APPOINTMENT (OUTPATIENT)
Dept: CT IMAGING | Facility: HOSPITAL | Age: 23
End: 2023-03-23
Payer: COMMERCIAL

## 2023-03-23 ENCOUNTER — APPOINTMENT (OUTPATIENT)
Dept: ULTRASOUND IMAGING | Facility: HOSPITAL | Age: 23
End: 2023-03-23
Payer: COMMERCIAL

## 2023-03-23 ENCOUNTER — HOSPITAL ENCOUNTER (OUTPATIENT)
Facility: HOSPITAL | Age: 23
Discharge: HOME | End: 2023-03-25
Attending: STUDENT IN AN ORGANIZED HEALTH CARE EDUCATION/TRAINING PROGRAM | Admitting: SURGERY
Payer: COMMERCIAL

## 2023-03-23 DIAGNOSIS — K35.80 ACUTE APPENDICITIS, UNSPECIFIED ACUTE APPENDICITIS TYPE: Primary | ICD-10-CM

## 2023-03-23 DIAGNOSIS — K35.30 ACUTE APPENDICITIS WITH LOCALIZED PERITONITIS, WITHOUT PERFORATION OR ABSCESS, UNSPECIFIED WHETHER GANGRENE PRESENT: ICD-10-CM

## 2023-03-23 PROBLEM — E87.1 HYPONATREMIA: Status: ACTIVE | Noted: 2023-03-23

## 2023-03-23 LAB
ALBUMIN SERPL-MCNC: 3.9 G/DL (ref 3.5–5.2)
ALBUMIN/GLOB SERPL: 1.6 G/DL
ALP SERPL-CCNC: 34 U/L (ref 39–117)
ALT SERPL W P-5'-P-CCNC: 15 U/L (ref 1–33)
ANION GAP SERPL CALCULATED.3IONS-SCNC: 9 MMOL/L (ref 5–15)
AST SERPL-CCNC: 13 U/L (ref 1–32)
BASOPHILS # BLD AUTO: 0.07 10*3/MM3 (ref 0–0.2)
BASOPHILS NFR BLD AUTO: 0.4 % (ref 0–1.5)
BILIRUB SERPL-MCNC: 0.3 MG/DL (ref 0–1.2)
BILIRUB UR QL STRIP: NEGATIVE
BUN SERPL-MCNC: 11 MG/DL (ref 6–20)
BUN/CREAT SERPL: 15.7 (ref 7–25)
CALCIUM SPEC-SCNC: 8.5 MG/DL (ref 8.6–10.5)
CHLORIDE SERPL-SCNC: 101 MMOL/L (ref 98–107)
CLARITY UR: CLEAR
CO2 SERPL-SCNC: 22 MMOL/L (ref 22–29)
COLOR UR: YELLOW
CREAT SERPL-MCNC: 0.7 MG/DL (ref 0.57–1)
D-LACTATE SERPL-SCNC: 0.7 MMOL/L (ref 0.5–2)
DEPRECATED RDW RBC AUTO: 38.9 FL (ref 37–54)
EGFRCR SERPLBLD CKD-EPI 2021: 125.6 ML/MIN/1.73
EOSINOPHIL # BLD AUTO: 0.54 10*3/MM3 (ref 0–0.4)
EOSINOPHIL NFR BLD AUTO: 3.4 % (ref 0.3–6.2)
ERYTHROCYTE [DISTWIDTH] IN BLOOD BY AUTOMATED COUNT: 12.5 % (ref 12.3–15.4)
GLOBULIN UR ELPH-MCNC: 2.5 GM/DL
GLUCOSE SERPL-MCNC: 78 MG/DL (ref 65–99)
GLUCOSE UR STRIP-MCNC: NEGATIVE MG/DL
HCG SERPL QL: NEGATIVE
HCT VFR BLD AUTO: 36.2 % (ref 34–46.6)
HGB BLD-MCNC: 12.4 G/DL (ref 12–15.9)
HGB UR QL STRIP.AUTO: NEGATIVE
HOLD SPECIMEN: NORMAL
IMM GRANULOCYTES # BLD AUTO: 0.05 10*3/MM3 (ref 0–0.05)
IMM GRANULOCYTES NFR BLD AUTO: 0.3 % (ref 0–0.5)
KETONES UR QL STRIP: ABNORMAL
LEUKOCYTE ESTERASE UR QL STRIP.AUTO: NEGATIVE
LIPASE SERPL-CCNC: 26 U/L (ref 13–60)
LYMPHOCYTES # BLD AUTO: 4.69 10*3/MM3 (ref 0.7–3.1)
LYMPHOCYTES NFR BLD AUTO: 29.9 % (ref 19.6–45.3)
MCH RBC QN AUTO: 29.5 PG (ref 26.6–33)
MCHC RBC AUTO-ENTMCNC: 34.3 G/DL (ref 31.5–35.7)
MCV RBC AUTO: 86.2 FL (ref 79–97)
MONOCYTES # BLD AUTO: 0.75 10*3/MM3 (ref 0.1–0.9)
MONOCYTES NFR BLD AUTO: 4.8 % (ref 5–12)
NEUTROPHILS NFR BLD AUTO: 61.2 % (ref 42.7–76)
NEUTROPHILS NFR BLD AUTO: 9.58 10*3/MM3 (ref 1.7–7)
NITRITE UR QL STRIP: NEGATIVE
NRBC BLD AUTO-RTO: 0 /100 WBC (ref 0–0.2)
PH UR STRIP.AUTO: 7 [PH] (ref 5–9)
PLATELET # BLD AUTO: 273 10*3/MM3 (ref 140–450)
PMV BLD AUTO: 9.9 FL (ref 6–12)
POTASSIUM SERPL-SCNC: 3.7 MMOL/L (ref 3.5–5.2)
PROT SERPL-MCNC: 6.4 G/DL (ref 6–8.5)
PROT UR QL STRIP: ABNORMAL
RBC # BLD AUTO: 4.2 10*6/MM3 (ref 3.77–5.28)
SODIUM SERPL-SCNC: 132 MMOL/L (ref 136–145)
SP GR UR STRIP: 1.1 (ref 1–1.03)
UROBILINOGEN UR QL STRIP: ABNORMAL
WBC NRBC COR # BLD: 15.68 10*3/MM3 (ref 3.4–10.8)
WHOLE BLOOD HOLD COAG: NORMAL

## 2023-03-23 PROCEDURE — 80053 COMPREHEN METABOLIC PANEL: CPT | Performed by: NURSE PRACTITIONER

## 2023-03-23 PROCEDURE — 83690 ASSAY OF LIPASE: CPT | Performed by: NURSE PRACTITIONER

## 2023-03-23 PROCEDURE — 83605 ASSAY OF LACTIC ACID: CPT | Performed by: NURSE PRACTITIONER

## 2023-03-23 PROCEDURE — G0378 HOSPITAL OBSERVATION PER HR: HCPCS

## 2023-03-23 PROCEDURE — 76830 TRANSVAGINAL US NON-OB: CPT

## 2023-03-23 PROCEDURE — 74177 CT ABD & PELVIS W/CONTRAST: CPT

## 2023-03-23 PROCEDURE — 25010000002 ONDANSETRON PER 1 MG: Performed by: NURSE PRACTITIONER

## 2023-03-23 PROCEDURE — 99284 EMERGENCY DEPT VISIT MOD MDM: CPT

## 2023-03-23 PROCEDURE — 25010000002 PIPERACILLIN SOD-TAZOBACTAM PER 1 G: Performed by: NURSE PRACTITIONER

## 2023-03-23 PROCEDURE — 25010000002 KETOROLAC TROMETHAMINE PER 15 MG: Performed by: NURSE PRACTITIONER

## 2023-03-23 PROCEDURE — 96361 HYDRATE IV INFUSION ADD-ON: CPT

## 2023-03-23 PROCEDURE — 81003 URINALYSIS AUTO W/O SCOPE: CPT | Performed by: NURSE PRACTITIONER

## 2023-03-23 PROCEDURE — 96375 TX/PRO/DX INJ NEW DRUG ADDON: CPT

## 2023-03-23 PROCEDURE — 96365 THER/PROPH/DIAG IV INF INIT: CPT

## 2023-03-23 PROCEDURE — 84703 CHORIONIC GONADOTROPIN ASSAY: CPT | Performed by: NURSE PRACTITIONER

## 2023-03-23 PROCEDURE — 25510000001 IOPAMIDOL 61 % SOLUTION: Performed by: STUDENT IN AN ORGANIZED HEALTH CARE EDUCATION/TRAINING PROGRAM

## 2023-03-23 PROCEDURE — 85025 COMPLETE CBC W/AUTO DIFF WBC: CPT | Performed by: NURSE PRACTITIONER

## 2023-03-23 RX ORDER — SODIUM CHLORIDE 9 MG/ML
40 INJECTION, SOLUTION INTRAVENOUS AS NEEDED
Status: DISCONTINUED | OUTPATIENT
Start: 2023-03-23 | End: 2023-03-25 | Stop reason: HOSPADM

## 2023-03-23 RX ORDER — ONDANSETRON 2 MG/ML
4 INJECTION INTRAMUSCULAR; INTRAVENOUS EVERY 6 HOURS PRN
Status: DISCONTINUED | OUTPATIENT
Start: 2023-03-23 | End: 2023-03-25 | Stop reason: HOSPADM

## 2023-03-23 RX ORDER — ACETAMINOPHEN 650 MG/1
650 SUPPOSITORY RECTAL EVERY 4 HOURS PRN
Status: DISCONTINUED | OUTPATIENT
Start: 2023-03-23 | End: 2023-03-25 | Stop reason: HOSPADM

## 2023-03-23 RX ORDER — SODIUM CHLORIDE, SODIUM LACTATE, POTASSIUM CHLORIDE, CALCIUM CHLORIDE 600; 310; 30; 20 MG/100ML; MG/100ML; MG/100ML; MG/100ML
150 INJECTION, SOLUTION INTRAVENOUS CONTINUOUS
Status: DISCONTINUED | OUTPATIENT
Start: 2023-03-23 | End: 2023-03-24

## 2023-03-23 RX ORDER — SODIUM CHLORIDE 0.9 % (FLUSH) 0.9 %
10 SYRINGE (ML) INJECTION AS NEEDED
Status: DISCONTINUED | OUTPATIENT
Start: 2023-03-23 | End: 2023-03-25 | Stop reason: HOSPADM

## 2023-03-23 RX ORDER — ONDANSETRON 4 MG/1
4 TABLET, FILM COATED ORAL EVERY 6 HOURS PRN
Status: DISCONTINUED | OUTPATIENT
Start: 2023-03-23 | End: 2023-03-25 | Stop reason: HOSPADM

## 2023-03-23 RX ORDER — ACETAMINOPHEN 325 MG/1
650 TABLET ORAL EVERY 4 HOURS PRN
Status: DISCONTINUED | OUTPATIENT
Start: 2023-03-23 | End: 2023-03-25 | Stop reason: HOSPADM

## 2023-03-23 RX ORDER — ONDANSETRON 2 MG/ML
4 INJECTION INTRAMUSCULAR; INTRAVENOUS ONCE
Status: COMPLETED | OUTPATIENT
Start: 2023-03-23 | End: 2023-03-23

## 2023-03-23 RX ORDER — NALOXONE HCL 0.4 MG/ML
0.4 VIAL (ML) INJECTION
Status: DISCONTINUED | OUTPATIENT
Start: 2023-03-23 | End: 2023-03-25 | Stop reason: HOSPADM

## 2023-03-23 RX ORDER — KETOROLAC TROMETHAMINE 30 MG/ML
30 INJECTION, SOLUTION INTRAMUSCULAR; INTRAVENOUS ONCE
Status: COMPLETED | OUTPATIENT
Start: 2023-03-23 | End: 2023-03-23

## 2023-03-23 RX ORDER — ACETAMINOPHEN 160 MG/5ML
650 SOLUTION ORAL EVERY 4 HOURS PRN
Status: DISCONTINUED | OUTPATIENT
Start: 2023-03-23 | End: 2023-03-23 | Stop reason: SDUPTHER

## 2023-03-23 RX ORDER — SODIUM CHLORIDE 0.9 % (FLUSH) 0.9 %
10 SYRINGE (ML) INJECTION EVERY 12 HOURS SCHEDULED
Status: DISCONTINUED | OUTPATIENT
Start: 2023-03-23 | End: 2023-03-25 | Stop reason: HOSPADM

## 2023-03-23 RX ORDER — DROPERIDOL 2.5 MG/ML
1.25 INJECTION, SOLUTION INTRAMUSCULAR; INTRAVENOUS EVERY 6 HOURS PRN
Status: DISCONTINUED | OUTPATIENT
Start: 2023-03-23 | End: 2023-03-25 | Stop reason: HOSPADM

## 2023-03-23 RX ORDER — MORPHINE SULFATE 2 MG/ML
2 INJECTION, SOLUTION INTRAMUSCULAR; INTRAVENOUS
Status: DISCONTINUED | OUTPATIENT
Start: 2023-03-23 | End: 2023-03-25 | Stop reason: HOSPADM

## 2023-03-23 RX ADMIN — KETOROLAC TROMETHAMINE 30 MG: 30 INJECTION, SOLUTION INTRAMUSCULAR; INTRAVENOUS at 19:39

## 2023-03-23 RX ADMIN — IOPAMIDOL 100 ML: 612 INJECTION, SOLUTION INTRAVENOUS at 18:54

## 2023-03-23 RX ADMIN — Medication 10 ML: at 22:59

## 2023-03-23 RX ADMIN — ONDANSETRON 4 MG: 2 INJECTION INTRAMUSCULAR; INTRAVENOUS at 19:39

## 2023-03-23 RX ADMIN — SODIUM CHLORIDE, POTASSIUM CHLORIDE, SODIUM LACTATE AND CALCIUM CHLORIDE 150 ML/HR: 600; 310; 30; 20 INJECTION, SOLUTION INTRAVENOUS at 22:59

## 2023-03-23 RX ADMIN — SODIUM CHLORIDE 1000 ML: 9 INJECTION, SOLUTION INTRAVENOUS at 19:39

## 2023-03-24 ENCOUNTER — APPOINTMENT (OUTPATIENT)
Dept: GENERAL RADIOLOGY | Facility: HOSPITAL | Age: 23
End: 2023-03-24
Payer: COMMERCIAL

## 2023-03-24 ENCOUNTER — ANESTHESIA EVENT (OUTPATIENT)
Dept: PERIOP | Facility: HOSPITAL | Age: 23
End: 2023-03-24
Payer: COMMERCIAL

## 2023-03-24 ENCOUNTER — PREP FOR SURGERY (OUTPATIENT)
Dept: OTHER | Facility: HOSPITAL | Age: 23
End: 2023-03-24
Payer: COMMERCIAL

## 2023-03-24 ENCOUNTER — ANESTHESIA (OUTPATIENT)
Dept: PERIOP | Facility: HOSPITAL | Age: 23
End: 2023-03-24
Payer: COMMERCIAL

## 2023-03-24 DIAGNOSIS — K35.30 ACUTE APPENDICITIS WITH LOCALIZED PERITONITIS, WITHOUT PERFORATION OR ABSCESS, UNSPECIFIED WHETHER GANGRENE PRESENT: Primary | ICD-10-CM

## 2023-03-24 PROCEDURE — 25010000002 FENTANYL CITRATE (PF) 100 MCG/2ML SOLUTION: Performed by: NURSE ANESTHETIST, CERTIFIED REGISTERED

## 2023-03-24 PROCEDURE — G0378 HOSPITAL OBSERVATION PER HR: HCPCS

## 2023-03-24 PROCEDURE — 25010000002 SUCCINYLCHOLINE PER 20 MG: Performed by: NURSE ANESTHETIST, CERTIFIED REGISTERED

## 2023-03-24 PROCEDURE — 74022 RADEX COMPL AQT ABD SERIES: CPT

## 2023-03-24 PROCEDURE — 44970 LAPAROSCOPY APPENDECTOMY: CPT | Performed by: SURGERY

## 2023-03-24 PROCEDURE — 25010000002 KETOROLAC TROMETHAMINE PER 15 MG

## 2023-03-24 PROCEDURE — 96376 TX/PRO/DX INJ SAME DRUG ADON: CPT

## 2023-03-24 PROCEDURE — 25010000002 PIPERACILLIN SOD-TAZOBACTAM PER 1 G

## 2023-03-24 PROCEDURE — 96361 HYDRATE IV INFUSION ADD-ON: CPT

## 2023-03-24 PROCEDURE — 25010000002 MIDAZOLAM PER 1 MG: Performed by: NURSE ANESTHETIST, CERTIFIED REGISTERED

## 2023-03-24 PROCEDURE — 25010000002 DEXAMETHASONE PER 1 MG: Performed by: NURSE ANESTHETIST, CERTIFIED REGISTERED

## 2023-03-24 PROCEDURE — 25010000002 NEOSTIGMINE 10 MG/10ML SOLUTION: Performed by: NURSE ANESTHETIST, CERTIFIED REGISTERED

## 2023-03-24 PROCEDURE — 25010000002 ONDANSETRON PER 1 MG: Performed by: NURSE ANESTHETIST, CERTIFIED REGISTERED

## 2023-03-24 PROCEDURE — 25010000002 ONDANSETRON PER 1 MG

## 2023-03-24 PROCEDURE — 25010000002 PROPOFOL 200 MG/20ML EMULSION: Performed by: NURSE ANESTHETIST, CERTIFIED REGISTERED

## 2023-03-24 PROCEDURE — 99232 SBSQ HOSP IP/OBS MODERATE 35: CPT | Performed by: SURGERY

## 2023-03-24 RX ORDER — NALOXONE HCL 0.4 MG/ML
0.4 VIAL (ML) INJECTION AS NEEDED
Status: DISCONTINUED | OUTPATIENT
Start: 2023-03-24 | End: 2023-03-24 | Stop reason: HOSPADM

## 2023-03-24 RX ORDER — SODIUM CHLORIDE, SODIUM LACTATE, POTASSIUM CHLORIDE, CALCIUM CHLORIDE 600; 310; 30; 20 MG/100ML; MG/100ML; MG/100ML; MG/100ML
75 INJECTION, SOLUTION INTRAVENOUS CONTINUOUS
Status: DISCONTINUED | OUTPATIENT
Start: 2023-03-24 | End: 2023-03-25 | Stop reason: HOSPADM

## 2023-03-24 RX ORDER — ROCURONIUM BROMIDE 10 MG/ML
INJECTION, SOLUTION INTRAVENOUS AS NEEDED
Status: DISCONTINUED | OUTPATIENT
Start: 2023-03-24 | End: 2023-03-24 | Stop reason: SURG

## 2023-03-24 RX ORDER — SCOLOPAMINE TRANSDERMAL SYSTEM 1 MG/1
1 PATCH, EXTENDED RELEASE TRANSDERMAL ONCE
Status: DISCONTINUED | OUTPATIENT
Start: 2023-03-24 | End: 2023-03-24

## 2023-03-24 RX ORDER — DEXAMETHASONE SODIUM PHOSPHATE 4 MG/ML
INJECTION, SOLUTION INTRA-ARTICULAR; INTRALESIONAL; INTRAMUSCULAR; INTRAVENOUS; SOFT TISSUE AS NEEDED
Status: DISCONTINUED | OUTPATIENT
Start: 2023-03-24 | End: 2023-03-24 | Stop reason: SURG

## 2023-03-24 RX ORDER — SODIUM CHLORIDE 9 MG/ML
40 INJECTION, SOLUTION INTRAVENOUS AS NEEDED
Status: DISCONTINUED | OUTPATIENT
Start: 2023-03-24 | End: 2023-03-24 | Stop reason: HOSPADM

## 2023-03-24 RX ORDER — ONDANSETRON 2 MG/ML
INJECTION INTRAMUSCULAR; INTRAVENOUS AS NEEDED
Status: DISCONTINUED | OUTPATIENT
Start: 2023-03-24 | End: 2023-03-24 | Stop reason: SURG

## 2023-03-24 RX ORDER — SUCCINYLCHOLINE CHLORIDE 20 MG/ML
INJECTION INTRAMUSCULAR; INTRAVENOUS AS NEEDED
Status: DISCONTINUED | OUTPATIENT
Start: 2023-03-24 | End: 2023-03-24 | Stop reason: SURG

## 2023-03-24 RX ORDER — SODIUM CHLORIDE 0.9 % (FLUSH) 0.9 %
10 SYRINGE (ML) INJECTION AS NEEDED
Status: DISCONTINUED | OUTPATIENT
Start: 2023-03-24 | End: 2023-03-24 | Stop reason: HOSPADM

## 2023-03-24 RX ORDER — PROPOFOL 10 MG/ML
INJECTION, EMULSION INTRAVENOUS AS NEEDED
Status: DISCONTINUED | OUTPATIENT
Start: 2023-03-24 | End: 2023-03-24 | Stop reason: SURG

## 2023-03-24 RX ORDER — ONDANSETRON 2 MG/ML
4 INJECTION INTRAMUSCULAR; INTRAVENOUS ONCE AS NEEDED
Status: DISCONTINUED | OUTPATIENT
Start: 2023-03-24 | End: 2023-03-24 | Stop reason: HOSPADM

## 2023-03-24 RX ORDER — NEOSTIGMINE METHYLSULFATE 1 MG/ML
INJECTION, SOLUTION INTRAVENOUS AS NEEDED
Status: DISCONTINUED | OUTPATIENT
Start: 2023-03-24 | End: 2023-03-24 | Stop reason: SURG

## 2023-03-24 RX ORDER — KETOROLAC TROMETHAMINE 15 MG/ML
15 INJECTION, SOLUTION INTRAMUSCULAR; INTRAVENOUS ONCE
Status: COMPLETED | OUTPATIENT
Start: 2023-03-24 | End: 2023-03-24

## 2023-03-24 RX ORDER — MIDAZOLAM HYDROCHLORIDE 1 MG/ML
INJECTION INTRAMUSCULAR; INTRAVENOUS AS NEEDED
Status: DISCONTINUED | OUTPATIENT
Start: 2023-03-24 | End: 2023-03-24 | Stop reason: SURG

## 2023-03-24 RX ORDER — DIPHENHYDRAMINE HYDROCHLORIDE 50 MG/ML
12.5 INJECTION INTRAMUSCULAR; INTRAVENOUS
Status: DISCONTINUED | OUTPATIENT
Start: 2023-03-24 | End: 2023-03-24 | Stop reason: HOSPADM

## 2023-03-24 RX ORDER — ACETAMINOPHEN 325 MG/1
650 TABLET ORAL ONCE AS NEEDED
Status: DISCONTINUED | OUTPATIENT
Start: 2023-03-24 | End: 2023-03-24 | Stop reason: HOSPADM

## 2023-03-24 RX ORDER — ACETAMINOPHEN 650 MG/1
650 SUPPOSITORY RECTAL ONCE AS NEEDED
Status: DISCONTINUED | OUTPATIENT
Start: 2023-03-24 | End: 2023-03-24 | Stop reason: HOSPADM

## 2023-03-24 RX ORDER — MIDODRINE HYDROCHLORIDE 5 MG/1
10 TABLET ORAL 3 TIMES DAILY PRN
Status: DISCONTINUED | OUTPATIENT
Start: 2023-03-24 | End: 2023-03-25 | Stop reason: HOSPADM

## 2023-03-24 RX ORDER — EPHEDRINE SULFATE 50 MG/ML
5 INJECTION, SOLUTION INTRAVENOUS ONCE AS NEEDED
Status: DISCONTINUED | OUTPATIENT
Start: 2023-03-24 | End: 2023-03-24 | Stop reason: HOSPADM

## 2023-03-24 RX ORDER — LIDOCAINE HYDROCHLORIDE 20 MG/ML
INJECTION, SOLUTION EPIDURAL; INFILTRATION; INTRACAUDAL; PERINEURAL AS NEEDED
Status: DISCONTINUED | OUTPATIENT
Start: 2023-03-24 | End: 2023-03-24 | Stop reason: SURG

## 2023-03-24 RX ORDER — BUPIVACAINE HYDROCHLORIDE AND EPINEPHRINE 5; 5 MG/ML; UG/ML
INJECTION, SOLUTION EPIDURAL; INTRACAUDAL; PERINEURAL AS NEEDED
Status: DISCONTINUED | OUTPATIENT
Start: 2023-03-24 | End: 2023-03-24 | Stop reason: HOSPADM

## 2023-03-24 RX ORDER — EPHEDRINE SULFATE 50 MG/ML
INJECTION INTRAVENOUS AS NEEDED
Status: DISCONTINUED | OUTPATIENT
Start: 2023-03-24 | End: 2023-03-24 | Stop reason: SURG

## 2023-03-24 RX ORDER — PROMETHAZINE HYDROCHLORIDE 25 MG/1
25 SUPPOSITORY RECTAL ONCE AS NEEDED
Status: DISCONTINUED | OUTPATIENT
Start: 2023-03-24 | End: 2023-03-24 | Stop reason: HOSPADM

## 2023-03-24 RX ORDER — PROMETHAZINE HYDROCHLORIDE 25 MG/1
25 TABLET ORAL ONCE AS NEEDED
Status: DISCONTINUED | OUTPATIENT
Start: 2023-03-24 | End: 2023-03-24 | Stop reason: HOSPADM

## 2023-03-24 RX ORDER — SODIUM CHLORIDE 0.9 % (FLUSH) 0.9 %
10 SYRINGE (ML) INJECTION EVERY 12 HOURS SCHEDULED
Status: DISCONTINUED | OUTPATIENT
Start: 2023-03-24 | End: 2023-03-24 | Stop reason: HOSPADM

## 2023-03-24 RX ORDER — HYDROCODONE BITARTRATE AND ACETAMINOPHEN 5; 325 MG/1; MG/1
1 TABLET ORAL EVERY 6 HOURS PRN
Status: DISCONTINUED | OUTPATIENT
Start: 2023-03-24 | End: 2023-03-25 | Stop reason: HOSPADM

## 2023-03-24 RX ORDER — SODIUM CHLORIDE 0.9 % (FLUSH) 0.9 %
10 SYRINGE (ML) INJECTION AS NEEDED
Status: CANCELLED | OUTPATIENT
Start: 2023-03-24

## 2023-03-24 RX ORDER — SODIUM CHLORIDE 9 MG/ML
40 INJECTION, SOLUTION INTRAVENOUS AS NEEDED
Status: CANCELLED | OUTPATIENT
Start: 2023-03-24

## 2023-03-24 RX ORDER — FENTANYL CITRATE 50 UG/ML
INJECTION, SOLUTION INTRAMUSCULAR; INTRAVENOUS AS NEEDED
Status: DISCONTINUED | OUTPATIENT
Start: 2023-03-24 | End: 2023-03-24

## 2023-03-24 RX ORDER — FLUMAZENIL 0.1 MG/ML
0.2 INJECTION INTRAVENOUS AS NEEDED
Status: DISCONTINUED | OUTPATIENT
Start: 2023-03-24 | End: 2023-03-24 | Stop reason: HOSPADM

## 2023-03-24 RX ORDER — SODIUM CHLORIDE 0.9 % (FLUSH) 0.9 %
10 SYRINGE (ML) INJECTION EVERY 12 HOURS SCHEDULED
Status: CANCELLED | OUTPATIENT
Start: 2023-03-24

## 2023-03-24 RX ADMIN — MIDAZOLAM HYDROCHLORIDE 2 MG: 1 INJECTION, SOLUTION INTRAMUSCULAR; INTRAVENOUS at 11:59

## 2023-03-24 RX ADMIN — EPHEDRINE SULFATE 10 MG: 50 INJECTION INTRAVENOUS at 12:32

## 2023-03-24 RX ADMIN — PIPERACILLIN SODIUM AND TAZOBACTAM SODIUM 3.38 G: 3; .375 INJECTION, POWDER, LYOPHILIZED, FOR SOLUTION INTRAVENOUS at 12:11

## 2023-03-24 RX ADMIN — KETOROLAC TROMETHAMINE 15 MG: 15 INJECTION, SOLUTION INTRAMUSCULAR; INTRAVENOUS at 00:47

## 2023-03-24 RX ADMIN — SODIUM CHLORIDE, POTASSIUM CHLORIDE, SODIUM LACTATE AND CALCIUM CHLORIDE 1500 ML: 600; 310; 30; 20 INJECTION, SOLUTION INTRAVENOUS at 00:48

## 2023-03-24 RX ADMIN — ONDANSETRON 4 MG: 2 INJECTION INTRAMUSCULAR; INTRAVENOUS at 12:48

## 2023-03-24 RX ADMIN — SUGAMMADEX 200 MG: 100 INJECTION, SOLUTION INTRAVENOUS at 13:08

## 2023-03-24 RX ADMIN — FENTANYL CITRATE 50 MCG: 50 INJECTION, SOLUTION INTRAMUSCULAR; INTRAVENOUS at 13:10

## 2023-03-24 RX ADMIN — PROPOFOL 150 MG: 10 INJECTION, EMULSION INTRAVENOUS at 12:07

## 2023-03-24 RX ADMIN — GLYCOPYRROLATE 0.4 MCG: 0.2 INJECTION, SOLUTION INTRAMUSCULAR; INTRAVITREAL at 12:53

## 2023-03-24 RX ADMIN — NEOSTIGMINE METHYLSULFATE 3 MG: 1 INJECTION INTRAVENOUS at 12:53

## 2023-03-24 RX ADMIN — ROCURONIUM BROMIDE 10 MG: 10 INJECTION, SOLUTION INTRAVENOUS at 12:21

## 2023-03-24 RX ADMIN — ONDANSETRON 4 MG: 2 INJECTION INTRAMUSCULAR; INTRAVENOUS at 06:29

## 2023-03-24 RX ADMIN — HYDROCODONE BITARTRATE AND ACETAMINOPHEN 1 TABLET: 5; 325 TABLET ORAL at 17:21

## 2023-03-24 RX ADMIN — SCOLOPAMINE TRANSDERMAL SYSTEM 1 PATCH: 1 PATCH, EXTENDED RELEASE TRANSDERMAL at 11:01

## 2023-03-24 RX ADMIN — SODIUM CHLORIDE, POTASSIUM CHLORIDE, SODIUM LACTATE AND CALCIUM CHLORIDE 75 ML/HR: 600; 310; 30; 20 INJECTION, SOLUTION INTRAVENOUS at 13:57

## 2023-03-24 RX ADMIN — PIPERACILLIN SODIUM AND TAZOBACTAM SODIUM 3.38 G: 3; .375 INJECTION, POWDER, LYOPHILIZED, FOR SOLUTION INTRAVENOUS at 02:54

## 2023-03-24 RX ADMIN — LIDOCAINE HYDROCHLORIDE 100 MG: 20 INJECTION, SOLUTION EPIDURAL; INFILTRATION; INTRACAUDAL; PERINEURAL at 12:07

## 2023-03-24 RX ADMIN — FENTANYL CITRATE 50 MCG: 50 INJECTION, SOLUTION INTRAMUSCULAR; INTRAVENOUS at 12:03

## 2023-03-24 RX ADMIN — HYDROCODONE BITARTRATE AND ACETAMINOPHEN 1 TABLET: 5; 325 TABLET ORAL at 23:32

## 2023-03-24 RX ADMIN — ROCURONIUM BROMIDE 2.5 MG: 10 INJECTION, SOLUTION INTRAVENOUS at 12:07

## 2023-03-24 RX ADMIN — EPHEDRINE SULFATE 10 MG: 50 INJECTION INTRAVENOUS at 12:17

## 2023-03-24 RX ADMIN — SODIUM CHLORIDE, POTASSIUM CHLORIDE, SODIUM LACTATE AND CALCIUM CHLORIDE 2000 ML: 600; 310; 30; 20 INJECTION, SOLUTION INTRAVENOUS at 04:48

## 2023-03-24 RX ADMIN — MIDODRINE HYDROCHLORIDE 10 MG: 5 TABLET ORAL at 04:36

## 2023-03-24 RX ADMIN — SUCCINYLCHOLINE CHLORIDE 120 MG: 20 INJECTION, SOLUTION INTRAMUSCULAR; INTRAVENOUS at 12:07

## 2023-03-24 RX ADMIN — DEXAMETHASONE SODIUM PHOSPHATE 4 MG: 4 INJECTION, SOLUTION INTRAMUSCULAR; INTRAVENOUS at 12:12

## 2023-03-24 RX ADMIN — ROCURONIUM BROMIDE 10 MG: 10 INJECTION, SOLUTION INTRAVENOUS at 12:25

## 2023-03-24 NOTE — NURSING NOTE
MD on call notified of patients BP 80/60 manually.  MD ordered medications and LR bolus as well as diagnostics.  RN administered medication and bolus per orders.

## 2023-03-24 NOTE — ANESTHESIA PREPROCEDURE EVALUATION
Anesthesia Evaluation     Patient summary reviewed and Nursing notes reviewed   history of anesthetic complications (Scopalamine patch ordered pre-op.): PONV  NPO Solid Status: Waived due to emergency  NPO Liquid Status: Waived due to emergency           Airway   Mallampati: II  TM distance: >3 FB  Neck ROM: full  possible difficult intubation  Dental    (+) poor dentation    Pulmonary     breath sounds clear to auscultation  (+) a smoker (Vapes.),   (-) recent URI  Cardiovascular - negative cardio ROS    ECG reviewed  Rhythm: regular  Rate: normal    (-) MARTINEZ, murmur    ROS comment: Date/Time: 1/6/2023 7:49 PM   Performed by: Micheal Lemus DO   Authorized by: Micheal Lemus DO   Interpreted by physician   Comments: EKG January 6, 2023 1949 reveals sinus tachycardia rate of 143   bpm.  Right axis deviation.  QTc 395.  No obvious acute ischemia.   Interpreted by Micheal Lemus DO on 1/7/2023  1:53 AM CST        Neuro/Psych  (+) headaches (Migraine.),    GI/Hepatic/Renal/Endo    (+)  GERD,  renal disease stones,     Musculoskeletal (-) negative ROS    Abdominal    Substance History - negative use     OB/GYN negative ob/gyn ROS   (-)  Pregnant (HCG negative)        Other - negative ROS       ROS/Med Hx Other: Acute appendicitis WBC 15.68K Patient with nausea, no vomiting.                  Anesthesia Plan    ASA 3 - emergent     general   Rapid sequence  intravenous induction     Anesthetic plan, risks, benefits, and alternatives have been provided, discussed and informed consent has been obtained with: patient.  Pre-procedure education provided  Plan discussed with CRNA.        CODE STATUS:    Level Of Support Discussed With: Patient  Code Status (Patient has no pulse and is not breathing): CPR (Attempt to Resuscitate)  Medical Interventions (Patient has pulse or is breathing): Full Support  Release to patient: Routine Release

## 2023-03-24 NOTE — PLAN OF CARE
Goal Outcome Evaluation:           Progress: improving  Outcome Evaluation: surgery today, patient doing well, VSS. tolerating diet, 5/10 pain.

## 2023-03-24 NOTE — H&P
[facility] Medicine Admission      Date of Admission: 3/23/2023      Primary Care Physician: Simone Hutchinson APRN    Chief Complaint:   Right lower quadrant abdominal pain    HPI:  This patient developed right lower quadrant abdominal pain yesterday which has been intermittent but more persistent today felt as a dull ache the majority of the time but with increased severity and a waxing waning picture.  She has had associated nausea and vomiting, or reduced urine output, reduced p.o. intake and has not had a bowel movement for over 24 hours.  Otherwise, the patient has no other systemic symptoms on review.    Patient has blood work there is markable for a sodium of 132, calcium 8.5, and white blood cell count 15.68.  The patient CT Abdo pelvis with contrast demonstrates an acute appendicitis.    Concurrent Medical History:  has a past medical history of GERD (gastroesophageal reflux disease), Headache, Kidney stone, and Tobacco abuse.    Past Surgical History:  has a past surgical history that includes No past surgeries and cystoscopy, ureteroscopy, retrograde pyelogram, stent insertion (Right, 1/7/2023).    Family History: family history includes Hyperlipidemia in her paternal grandfather; Hypertension in her father and maternal grandmother; Kidney failure in her maternal grandmother; No Known Problems in her brother, maternal grandfather, mother, sister, sister, and sister.     Social History:  reports that she has been smoking electronic cigarette. She has quit using smokeless tobacco. She reports current alcohol use of about 2.0 standard drinks per week. She reports that she does not use drugs.    Allergies: No Known Allergies    Medications:   Prior to Admission medications    Medication Sig Start Date End Date Taking? Authorizing Provider   doxycycline (VIBRAMYCIN) 100 MG capsule Take 1 capsule by mouth Daily. 1/24/23   Provider, MD Jimenez       Review of  Systems:  Review of Systems   Constitutional: Negative for chills, diaphoresis, fever and unexpected weight change.   HENT: Negative for congestion, ear pain, sore throat, tinnitus, trouble swallowing and voice change.    Respiratory: Negative for cough, choking, shortness of breath, wheezing and stridor.    Cardiovascular: Negative for chest pain, palpitations and leg swelling.   Gastrointestinal: Positive for abdominal pain (RLQ/groin), constipation (1 day), nausea and vomiting. Negative for abdominal distention and diarrhea.   Genitourinary: Positive for decreased urine volume. Negative for difficulty urinating, dysuria, frequency, hematuria and urgency.   Musculoskeletal: Negative for arthralgias, joint swelling and myalgias.   Skin: Negative for color change, rash and wound.   Neurological: Negative for dizziness, syncope, light-headedness and headaches.   Psychiatric/Behavioral: Negative for agitation, confusion and hallucinations.      Otherwise complete ROS is negative except as mentioned above.    Physical Exam:   Temp:  [98.4 °F (36.9 °C)] 98.4 °F (36.9 °C)  Heart Rate:  [55-63] 62  Resp:  [16-20] 20  BP: (101-106)/(57-64) 106/57  Physical Exam  Vitals and nursing note reviewed.   Constitutional:       General: She is not in acute distress.     Appearance: She is normal weight. She is not ill-appearing (looks remarkably well considering, at the time of my assessment), toxic-appearing or diaphoretic.   HENT:      Head: Normocephalic and atraumatic.      Nose: Nose normal. No congestion or rhinorrhea.      Mouth/Throat:      Mouth: Mucous membranes are moist.      Pharynx: Oropharynx is clear. No oropharyngeal exudate or posterior oropharyngeal erythema.   Eyes:      General:         Right eye: No discharge.         Left eye: No discharge.      Extraocular Movements: Extraocular movements intact.      Conjunctiva/sclera: Conjunctivae normal.      Pupils: Pupils are equal, round, and reactive to light.    Cardiovascular:      Rate and Rhythm: Normal rate and regular rhythm.      Pulses: Normal pulses.      Heart sounds: Normal heart sounds. No murmur heard.    No friction rub. No gallop.   Pulmonary:      Effort: Pulmonary effort is normal. No respiratory distress.      Breath sounds: Normal breath sounds. No stridor. No wheezing, rhonchi or rales.   Abdominal:      General: Abdomen is flat. There is no distension.      Palpations: Abdomen is soft. There is no mass.      Tenderness: There is abdominal tenderness in the right lower quadrant. There is no right CVA tenderness, left CVA tenderness, guarding or rebound. Positive signs include Rovsing's sign.      Hernia: No hernia is present.   Musculoskeletal:         General: No swelling.      Right lower leg: No edema.      Left lower leg: No edema.   Skin:     Capillary Refill: Capillary refill takes less than 2 seconds.      Coloration: Skin is not jaundiced.      Findings: No bruising, erythema or rash.   Neurological:      General: No focal deficit present.      Mental Status: She is alert and oriented to person, place, and time. Mental status is at baseline.      Cranial Nerves: No cranial nerve deficit.   Psychiatric:         Behavior: Behavior normal.         Thought Content: Thought content normal.         Judgment: Judgment normal.         Results Reviewed:  I have personally reviewed current lab, radiology, and data and agree with results.  Lab Results (last 24 hours)     Procedure Component Value Units Date/Time    Lactic Acid, Plasma [[id number]]  (Normal) Collected: 03/23/23 2112    Specimen: Blood Updated: 03/23/23 2132     Lactate 0.7 mmol/L     Urinalysis With Microscopic If Indicated (No Culture) - Urine, Clean Catch [[id number]]  (Abnormal) Collected: 03/23/23 2034    Specimen: Urine, Clean Catch Updated: 03/23/23 2051     Color, UA Yellow     Appearance, UA Clear     pH, UA 7.0     Specific Gravity, UA 1.097     Comment: Result obtained by  Refractometer        Glucose, UA Negative     Ketones, UA Trace     Bilirubin, UA Negative     Blood, UA Negative     Protein, UA Trace     Leuk Esterase, UA Negative     Nitrite, UA Negative     Urobilinogen, UA 1.0 E.U./dL    Narrative:      Urine microscopic not indicated.    Extra Tubes [[id number]] Collected: 03/23/23 1935    Specimen: Blood Updated: 03/23/23 2046    Narrative:      The following orders were created for panel order Extra Tubes.  Procedure                               Abnormality         Status                     ---------                               -----------         ------                     Gold Top - SST[[id number]]                                   Final result               Light Blue Top[[id number]]                                   Final result                 Please view results for these tests on the individual orders.    Gold Top - SST [[id number]] Collected: 03/23/23 1935    Specimen: Blood Updated: 03/23/23 2046     Extra Tube Hold for add-ons.     Comment: Auto resulted.       Light Blue Top [[id number]] Collected: 03/23/23 1935    Specimen: Blood Updated: 03/23/23 2046     Extra Tube Hold for add-ons.     Comment: Auto resulted       hCG, Serum, Qualitative [[id number]]  (Normal) Collected: 03/23/23 1935    Specimen: Blood Updated: 03/23/23 2026     HCG Qualitative Negative    Comprehensive Metabolic Panel [[id number]]  (Abnormal) Collected: 03/23/23 1935    Specimen: Blood Updated: 03/23/23 2004     Glucose 78 mg/dL      BUN 11 mg/dL      Creatinine 0.70 mg/dL      Sodium 132 mmol/L      Potassium 3.7 mmol/L      Chloride 101 mmol/L      CO2 22.0 mmol/L      Calcium 8.5 mg/dL      Total Protein 6.4 g/dL      Albumin 3.9 g/dL      ALT (SGPT) 15 U/L      AST (SGOT) 13 U/L      Alkaline Phosphatase 34 U/L      Total Bilirubin 0.3 mg/dL      Globulin 2.5 gm/dL      A/G Ratio 1.6 g/dL      BUN/Creatinine Ratio 15.7     Anion Gap 9.0 mmol/L      eGFR 125.6 mL/min/1.73     Narrative:       GFR Normal >60  Chronic Kidney Disease <60  Kidney Failure <15      Lipase [[id number]]  (Normal) Collected: 03/23/23 1935    Specimen: Blood Updated: 03/23/23 2004     Lipase 26 U/L     CBC & Differential [[id number]]  (Abnormal) Collected: 03/23/23 1935    Specimen: Blood Updated: 03/23/23 1949    Narrative:      The following orders were created for panel order CBC & Differential.  Procedure                               Abnormality         Status                     ---------                               -----------         ------                     CBC Auto Differential[[id number]]        Abnormal            Final result                 Please view results for these tests on the individual orders.    CBC Auto Differential [[id number]]  (Abnormal) Collected: 03/23/23 1935    Specimen: Blood Updated: 03/23/23 1949     WBC 15.68 10*3/mm3      RBC 4.20 10*6/mm3      Hemoglobin 12.4 g/dL      Hematocrit 36.2 %      MCV 86.2 fL      MCH 29.5 pg      MCHC 34.3 g/dL      RDW 12.5 %      RDW-SD 38.9 fl      MPV 9.9 fL      Platelets 273 10*3/mm3      Neutrophil % 61.2 %      Lymphocyte % 29.9 %      Monocyte % 4.8 %      Eosinophil % 3.4 %      Basophil % 0.4 %      Immature Grans % 0.3 %      Neutrophils, Absolute 9.58 10*3/mm3      Lymphocytes, Absolute 4.69 10*3/mm3      Monocytes, Absolute 0.75 10*3/mm3      Eosinophils, Absolute 0.54 10*3/mm3      Basophils, Absolute 0.07 10*3/mm3      Immature Grans, Absolute 0.05 10*3/mm3      nRBC 0.0 /100 WBC         Imaging Results (Last 24 Hours)     Procedure Component Value Units Date/Time    US Non-ob Transvaginal [[id number]] Collected: 03/23/23 1848     Updated: 03/23/23 2015    Narrative:      EXAM:   US PELVIS TRANSVAGINAL    ORDERING PROVIDER:  BALTA LAWTON    CLINICAL HISTORY:  Right lower quadrant pain    COMPARISON STUDY:       TECHNIQUE:   real-time 2-dimensional grayscale, color Doppler with spectral  analysis of the arterial and venous blood supply was  performed of  each ovary.  Ultrasound evaluation of the uterus was also  obtained.    FINDINGS:    UTERUS:  Normal in size measuring 7.1 x 3.3 x 5.1 cm . Myometrial  echotexture is within normal.  No fibroids are noted.    ENDOMETRIAL ECHO:  3 mm and is unremarkable without mass, cyst or  fluid.     RIGHT OVARY: 2.3 x 3.0 x 1.4cm. No mass or suspicious cyst.  Normal color and spectral waveforms are seen within the arterial  and venous blood supply of the right ovary.    LEFT OVARY:  2.6 x 1.4 x 2.2cm. No mass or suspicious cyst.   Normal color and spectral waveforms are seen within the arterial  and venous blood supply of the left ovary.    ADNEXA: No masses. Small amount of pelvic free fluid.      Impression:      Small amount of pelvic free fluid.  Normal bilateral ovaries and uterus.    Electronically signed by:  Martínez Ramos MD  3/23/2023 8:13 PM CDT  Workstation: [id number]    CT Abdomen Pelvis With Contrast [[id number]] Collected: 03/23/23 1853     Updated: 03/23/23 1941    Narrative:      EXAM:  CT ABDOMEN PELVIS WITH IV CONTRAST    ORDERING PROVIDER:  BALTA LAWTON    CLINICAL HISTORY:  right lower quadrant pain    COMPARISON:      TECHNIQUE:   CT abdomen and pelvis performed with 90ml of Isovue-300 as IV  contrast and reformatted in the sagittal and coronal planes.     This examination was performed according to our departmental dose  optimization program which includes automated exposure coentrol,  adjustment of the MA and kV according to patient size, and/or use  of iterative reconstruction technique.     FINDINGS:    BASILAR CHEST: No consolidation, nodule or effusion.     LIVER: No mass, enlargement or abnormal density.     BILIARY TRACT: Unremarkable gallbladder.     SPLEEN: No mass or enlargement.     PANCREAS: No mass or inflammatory process. Normal pancreatic  duct.     ADRENAL GLANDS: Unremarkable. No mass.     URINARY SYSTEM: Kidneys are normal in size. No stone,  hydronephrosis, or mass.  Normal ureters.  Bladder is normal  without mass or stone.      GI TRACT: No mass, dilation, or wall thickening.  No diverticula.   No hernia.  Appendix is abnormal best visualized on coronal  image 24 of series 4 concerning for acute appendicitis.      REPRODUCTIVE SYSTEM: Unremarkable    PERITONEAL SPACE:No free air, but with small amount of pelvic  free fluid, and no gross mass or adenopathy.     RETROPERITONEAL SPACE:  No adenopathy, mass, aneurysm or  significant vascular abnormality.     BONES AND EXTRA-ABDOMINAL SOFT TISSUES: No aggressive osseous  lesion.  No inguinal adenopathy or hernia.      Impression:      Acute appendicitis.  No gross abscess.  Small amount of pelvic free fluid.    Electronically signed by:  Martínez Ramos MD  3/23/2023 7:39 PM CDT  Workstation: [id number]            Assessment:    Active Hospital Problems    Diagnosis    • **Acute appendicitis    • Hyponatremia          Medical Decision Making  Number and Complexity of problems: 2 - High    Differential Diagnosis:   As above    Conditions and Status:        Condition is at treatment goal.     MDM Data  External documents reviewed: Nil  My EKG interpretation: N/A  My CT interpretation: Acute appendicitis  Tests considered but not ordered: Nil     Decision rules/scores evaluated (example [id number], Wells, etc):   Angel     Discussed with: The patient     Treatment Plan  1.  General surgery of already consulted from the ED department and Dr. Benites has indicated that he will take the patient to surgery tomorrow in the a.m.  2.  Medication reconciliation   3.  NPO  4.  VTE prophylaxis will not likely be necessary in this case  5.  Daily blood work to include trending of infectious markers and biochemical abnormalities  6.  IV fluid maintenance      Care Planning  Shared decision making: With the patient  Code status and discussions: Full code    Disposition  Social Determinants of Health that impact treatment or disposition: Nil  I  expect the patient to be discharged to home in 2-4 days.       Iain Sosa MD    Electronically signed by Iain Sosa MD, 03/23/23, 9:43 PM CDT.

## 2023-03-24 NOTE — ANESTHESIA POSTPROCEDURE EVALUATION
Patient: Yasmeen Sandoval    Procedure Summary     Date: 03/24/23 Room / Location: [facility] OR 08 / [facility] OR    Anesthesia Start: 1201 Anesthesia Stop: 1313    Procedure: APPENDECTOMY LAPAROSCOPIC (Abdomen) Diagnosis:       Acute appendicitis with localized peritonitis, without perforation or abscess, unspecified whether gangrene present      (Acute appendicitis with localized peritonitis, without perforation or abscess, unspecified whether gangrene present [K35.30])    Surgeons: Theodore Benites MD Provider: Suly Mehta CRNA    Anesthesia Type: general ASA Status: 3 - Emergent          Anesthesia Type: general    Vitals  No vitals data found for the desired time range.          Post Anesthesia Care and Evaluation    Patient location during evaluation: PACU  Patient participation: complete - patient participated  Level of consciousness: awake and awake and alert  Pain score: 0  Pain management: satisfactory to patient    Airway patency: patent  Anesthetic complications: No anesthetic complications  PONV Status: none  Cardiovascular status: acceptable and stable  Respiratory status: acceptable, room air and spontaneous ventilation  Hydration status: acceptable    Comments: /42  HR 75  02 97  TEMP 98.0

## 2023-03-24 NOTE — ED NOTES
"Nursing report ED to floor  Yasmeen Sandoval  23 y.o.  female    HPI:   Chief Complaint   Patient presents with    Abdominal Pain    Vomiting       Admitting doctor:   Iain Sosa MD    Consulting provider(s):  Consults       No orders found from 2/22/2023 to 3/24/2023.             Admitting diagnosis:   The encounter diagnosis was Acute appendicitis, unspecified acute appendicitis type.    Code status:   Current Code Status       Date Active Code Status Order ID Comments User Context       Prior            Allergies:   Patient has no known allergies.    Intake and Output    Intake/Output Summary (Last 24 hours) at 3/23/2023 2105  Last data filed at 3/23/2023 2034  Gross per 24 hour   Intake 1000 ml   Output --   Net 1000 ml       Weight:       03/23/23 1743   Weight: 63.5 kg (140 lb)       Most recent vitals:   Vitals:    03/23/23 1743 03/23/23 1931   BP: 101/60 106/64   BP Location: Right arm Right arm   Patient Position: Sitting Sitting   Pulse: 63 55   Resp: 18 16   Temp: 98.4 °F (36.9 °C)    TempSrc: Oral    SpO2: 99% 100%   Weight: 63.5 kg (140 lb)    Height: 162.6 cm (64\")      Oxygen Therapy: ra    Active LDAs/IV Access:   Lines, Drains & Airways       Active LDAs       Name Placement date Placement time Site Days    Peripheral IV 03/23/23 1938 Right Antecubital 03/23/23 1938  Antecubital  less than 1                    Labs (abnormal labs have a star):   Labs Reviewed   COMPREHENSIVE METABOLIC PANEL - Abnormal; Notable for the following components:       Result Value    Sodium 132 (*)     Calcium 8.5 (*)     Alkaline Phosphatase 34 (*)     All other components within normal limits    Narrative:     GFR Normal >60  Chronic Kidney Disease <60  Kidney Failure <15     URINALYSIS W/ MICROSCOPIC IF INDICATED (NO CULTURE) - Abnormal; Notable for the following components:    Specific Gravity, UA 1.097 (*)     Ketones, UA Trace (*)     Protein, UA Trace (*)     All other components within normal " limits    Narrative:     Urine microscopic not indicated.   CBC WITH AUTO DIFFERENTIAL - Abnormal; Notable for the following components:    WBC 15.68 (*)     Monocyte % 4.8 (*)     Neutrophils, Absolute 9.58 (*)     Lymphocytes, Absolute 4.69 (*)     Eosinophils, Absolute 0.54 (*)     All other components within normal limits   LIPASE - Normal   HCG, SERUM, QUALITATIVE - Normal   LACTIC ACID, PLASMA   CBC AND DIFFERENTIAL    Narrative:     The following orders were created for panel order CBC & Differential.  Procedure                               Abnormality         Status                     ---------                               -----------         ------                     CBC Auto Differential[[id number]]        Abnormal            Final result                 Please view results for these tests on the individual orders.   EXTRA TUBES    Narrative:     The following orders were created for panel order Extra Tubes.  Procedure                               Abnormality         Status                     ---------                               -----------         ------                     Gold Top - SST[[id number]]                                   Final result               Light Blue Top[[id number]]                                   Final result                 Please view results for these tests on the individual orders.   GOLD TOP - SST   LIGHT BLUE TOP       Meds given in ED:   Medications   sodium chloride 0.9 % flush 10 mL (has no administration in time range)   piperacillin-tazobactam (ZOSYN) 3.375 g/100 mL 0.9% NS IVPB (mbp) (has no administration in time range)   sodium chloride 0.9 % bolus 1,000 mL (0 mL Intravenous Stopped 3/23/23 2034)   ondansetron (ZOFRAN) injection 4 mg (4 mg Intravenous Given 3/23/23 1939)   ketorolac (TORADOL) injection 30 mg (30 mg Intravenous Given 3/23/23 1939)   iopamidol (ISOVUE-300) 61 % injection 100 mL (100 mL Intravenous Given 3/23/23 1854)           NIH Stroke Scale:        Isolation/Infection(s):  No active isolations   No active infections     COVID Testing  Collected no  Resulted no    Nursing report ED to floor:  Mental status: gcs 15  Ambulatory status: ad max  Precautions: none    ED nurse phone [phone]

## 2023-03-24 NOTE — ANESTHESIA PROCEDURE NOTES
Airway  Urgency: elective    Date/Time: 3/24/2023 12:09 PM  Airway not difficult    General Information and Staff    Patient location during procedure: OR  CRNA/CAA: Suly Mehta CRNA  SRNA: Yinka Cruz SRNA  Indications and Patient Condition  Indications for airway management: airway protection    Preoxygenated: yes  MILS not maintained throughout  Mask difficulty assessment: 1 - vent by mask    Final Airway Details  Final airway type: endotracheal airway      Successful airway: ETT  Cuffed: yes   Successful intubation technique: direct laryngoscopy  Facilitating devices/methods: intubating stylet  Endotracheal tube insertion site: oral  Blade: Bartolo  Blade size: 3  ETT size (mm): 7.0  Cormack-Lehane Classification: grade I - full view of glottis  Placement verified by: chest auscultation, capnometry and palpation of cuff   Measured from: lips  ETT/EBT  to lips (cm): 21  Number of attempts at approach: 1  Assessment: lips, teeth, and gum same as pre-op and atraumatic intubation

## 2023-03-24 NOTE — ED PROVIDER NOTES
Subjective   History of Present Illness  Patient presents to the ER with c/o right lower quadrant/suprapubic pain that started two days ago. She reports vomiting Tylenol earlier today. LMP was 3/10/23. She was seen at  and sent here for further evaluation. She reports a history of kidney stones in January but states she is having no back pain with the pain today.         Review of Systems   Constitutional: Negative for chills and fever.   Gastrointestinal: Positive for abdominal pain, nausea and vomiting.   Genitourinary: Negative for decreased urine volume and flank pain.   Musculoskeletal: Negative for back pain.   Neurological: Negative.    Psychiatric/Behavioral: Negative.        Past Medical History:   Diagnosis Date   • GERD (gastroesophageal reflux disease)    • Headache    • Kidney stone    • Tobacco abuse        No Known Allergies    Past Surgical History:   Procedure Laterality Date   • CYSTOSCOPY, URETEROSCOPY, RETROGRADE PYELOGRAM, STENT INSERTION Right 1/7/2023    Procedure: CYSTOSCOPY RIGHT URETEROSCOPY RETROGRADE PYELOGRAM HOLMIUM STENT INSERTION;  Surgeon: Brenda, Lawrence CHU MD;  Location: [facility];  Service: Urology;  Laterality: Right;   • NO PAST SURGERIES         Family History   Problem Relation Age of Onset   • No Known Problems Mother    • Hypertension Father    • No Known Problems Sister    • No Known Problems Sister    • No Known Problems Sister    • No Known Problems Brother    • Hypertension Maternal Grandmother    • Kidney failure Maternal Grandmother    • No Known Problems Maternal Grandfather    • Hyperlipidemia Paternal Grandfather        Social History     Socioeconomic History   • Marital status: Single   Tobacco Use   • Smoking status: Every Day     Types: Electronic Cigarette   • Smokeless tobacco: Former   Vaping Use   • Vaping Use: Every day   • Substances: Nicotine, Flavoring   • Devices: Disposable   Substance and Sexual Activity   • Alcohol use: Yes     Alcohol/week: 2.0  "standard drinks     Types: 1 Shots of liquor, 1 Drinks containing 0.5 oz of alcohol per week   • Drug use: No   • Sexual activity: Yes     Partners: Male     Birth control/protection: Pill           Objective    /68 (BP Location: Right arm, Patient Position: Lying)   Pulse 61   Temp 97.7 °F (36.5 °C) (Temporal)   Resp 18   Ht 162.6 cm (64\")   Wt 63.5 kg (140 lb)   LMP 03/10/2023 (Exact Date)   SpO2 100%   BMI 24.03 kg/m²     Physical Exam  Vitals and nursing note reviewed.   Constitutional:       Appearance: She is not ill-appearing.   HENT:      Head: Normocephalic and atraumatic.   Cardiovascular:      Rate and Rhythm: Normal rate and regular rhythm.   Abdominal:      General: Bowel sounds are normal.      Palpations: Abdomen is soft.      Tenderness: There is abdominal tenderness in the right lower quadrant. There is guarding. There is no rebound.       Skin:     General: Skin is warm and dry.      Capillary Refill: Capillary refill takes less than 2 seconds.   Neurological:      Mental Status: She is alert and oriented to person, place, and time.   Psychiatric:         Mood and Affect: Mood normal.         Behavior: Behavior normal.         Procedures  Results for orders placed or performed during the hospital encounter of 03/23/23   Comprehensive Metabolic Panel    Specimen: Blood   Result Value Ref Range    Glucose 78 65 - 99 mg/dL    BUN 11 6 - 20 mg/dL    Creatinine 0.70 0.57 - 1.00 mg/dL    Sodium 132 (L) 136 - 145 mmol/L    Potassium 3.7 3.5 - 5.2 mmol/L    Chloride 101 98 - 107 mmol/L    CO2 22.0 22.0 - 29.0 mmol/L    Calcium 8.5 (L) 8.6 - 10.5 mg/dL    Total Protein 6.4 6.0 - 8.5 g/dL    Albumin 3.9 3.5 - 5.2 g/dL    ALT (SGPT) 15 1 - 33 U/L    AST (SGOT) 13 1 - 32 U/L    Alkaline Phosphatase 34 (L) 39 - 117 U/L    Total Bilirubin 0.3 0.0 - 1.2 mg/dL    Globulin 2.5 gm/dL    A/G Ratio 1.6 g/dL    BUN/Creatinine Ratio 15.7 7.0 - 25.0    Anion Gap 9.0 5.0 - 15.0 mmol/L    eGFR 125.6 >60.0 " mL/min/1.73   Lipase    Specimen: Blood   Result Value Ref Range    Lipase 26 13 - 60 U/L   Urinalysis With Microscopic If Indicated (No Culture) - Urine, Clean Catch    Specimen: Urine, Clean Catch   Result Value Ref Range    Color, UA Yellow Yellow, Straw, Dark Yellow, Shannan    Appearance, UA Clear Clear    pH, UA 7.0 5.0 - 9.0    Specific Gravity, UA 1.097 (H) 1.003 - 1.030    Glucose, UA Negative Negative    Ketones, UA Trace (A) Negative    Bilirubin, UA Negative Negative    Blood, UA Negative Negative    Protein, UA Trace (A) Negative    Leuk Esterase, UA Negative Negative    Nitrite, UA Negative Negative    Urobilinogen, UA 1.0 E.U./dL 0.2 - 1.0 E.U./dL   CBC Auto Differential    Specimen: Blood   Result Value Ref Range    WBC 15.68 (H) 3.40 - 10.80 10*3/mm3    RBC 4.20 3.77 - 5.28 10*6/mm3    Hemoglobin 12.4 12.0 - 15.9 g/dL    Hematocrit 36.2 34.0 - 46.6 %    MCV 86.2 79.0 - 97.0 fL    MCH 29.5 26.6 - 33.0 pg    MCHC 34.3 31.5 - 35.7 g/dL    RDW 12.5 12.3 - 15.4 %    RDW-SD 38.9 37.0 - 54.0 fl    MPV 9.9 6.0 - 12.0 fL    Platelets 273 140 - 450 10*3/mm3    Neutrophil % 61.2 42.7 - 76.0 %    Lymphocyte % 29.9 19.6 - 45.3 %    Monocyte % 4.8 (L) 5.0 - 12.0 %    Eosinophil % 3.4 0.3 - 6.2 %    Basophil % 0.4 0.0 - 1.5 %    Immature Grans % 0.3 0.0 - 0.5 %    Neutrophils, Absolute 9.58 (H) 1.70 - 7.00 10*3/mm3    Lymphocytes, Absolute 4.69 (H) 0.70 - 3.10 10*3/mm3    Monocytes, Absolute 0.75 0.10 - 0.90 10*3/mm3    Eosinophils, Absolute 0.54 (H) 0.00 - 0.40 10*3/mm3    Basophils, Absolute 0.07 0.00 - 0.20 10*3/mm3    Immature Grans, Absolute 0.05 0.00 - 0.05 10*3/mm3    nRBC 0.0 0.0 - 0.2 /100 WBC   hCG, Serum, Qualitative    Specimen: Blood   Result Value Ref Range    HCG Qualitative Negative Negative   Lactic Acid, Plasma    Specimen: Blood   Result Value Ref Range    Lactate 0.7 0.5 - 2.0 mmol/L   Gold Top - SST   Result Value Ref Range    Extra Tube Hold for add-ons.    Light Blue Top   Result Value Ref  Range    Extra Tube Hold for add-ons.      US Non-ob Transvaginal    Result Date: 3/23/2023  Narrative: EXAM: US PELVIS TRANSVAGINAL ORDERING PROVIDER: BALTA LAWTON CLINICAL HISTORY: Right lower quadrant pain COMPARISON STUDY: TECHNIQUE: real-time 2-dimensional grayscale, color Doppler with spectral analysis of the arterial and venous blood supply was performed of each ovary.  Ultrasound evaluation of the uterus was also obtained. FINDINGS: UTERUS:  Normal in size measuring 7.1 x 3.3 x 5.1 cm . Myometrial echotexture is within normal.  No fibroids are noted. ENDOMETRIAL ECHO:  3 mm and is unremarkable without mass, cyst or fluid. RIGHT OVARY: 2.3 x 3.0 x 1.4cm. No mass or suspicious cyst. Normal color and spectral waveforms are seen within the arterial and venous blood supply of the right ovary. LEFT OVARY:  2.6 x 1.4 x 2.2cm. No mass or suspicious cyst. Normal color and spectral waveforms are seen within the arterial and venous blood supply of the left ovary. ADNEXA: No masses. Small amount of pelvic free fluid.     Impression: Small amount of pelvic free fluid. Normal bilateral ovaries and uterus. Electronically signed by:  Martínez Ramos MD  3/23/2023 8:13 PM CDT Workstation: [id number]    CT Abdomen Pelvis With Contrast    Result Date: 3/23/2023  Narrative: EXAM: CT ABDOMEN PELVIS WITH IV CONTRAST ORDERING PROVIDER: BALTA LAWTON CLINICAL HISTORY: right lower quadrant pain COMPARISON: TECHNIQUE: CT abdomen and pelvis performed with 90ml of Isovue-300 as IV contrast and reformatted in the sagittal and coronal planes. This examination was performed according to our departmental dose optimization program which includes automated exposure coentrol, adjustment of the MA and kV according to patient size, and/or use of iterative reconstruction technique. FINDINGS: BASILAR CHEST: No consolidation, nodule or effusion. LIVER: No mass, enlargement or abnormal density. BILIARY TRACT: Unremarkable gallbladder.  SPLEEN: No mass or enlargement. PANCREAS: No mass or inflammatory process. Normal pancreatic duct. ADRENAL GLANDS: Unremarkable. No mass. URINARY SYSTEM: Kidneys are normal in size. No stone, hydronephrosis, or mass. Normal ureters.  Bladder is normal without mass or stone.  GI TRACT: No mass, dilation, or wall thickening.  No diverticula.  No hernia.  Appendix is abnormal best visualized on coronal image 24 of series 4 concerning for acute appendicitis.  REPRODUCTIVE SYSTEM: Unremarkable PERITONEAL SPACE:No free air, but with small amount of pelvic free fluid, and no gross mass or adenopathy. RETROPERITONEAL SPACE:  No adenopathy, mass, aneurysm or significant vascular abnormality. BONES AND EXTRA-ABDOMINAL SOFT TISSUES: No aggressive osseous lesion.  No inguinal adenopathy or hernia.     Impression: Acute appendicitis. No gross abscess. Small amount of pelvic free fluid. Electronically signed by:  Martínez Ramos MD  3/23/2023 7:39 PM CDT Workstation: [id number]             ED Course  ED Course as of 03/23/23 2216   Thu Mar 23, 2023   1943 Patient has appendicitis. Pending CBC at this time.  [SH]   2012 Spoke with Dr. Benites who states to admit to hospitalist and consult him. He will complete her surgery tomorrow. . [SH]   2031 Dr. Sosa paged.  [SH]   2043 Spoke with Dr. Sosa who agrees to admission.  [SH]   2044 Patient updated on admission with surgery tomorrow. Advised NPO after midnight.  [SH]      ED Course User Index  [SH] Dior Enriquez APRN                                           Medical Decision Making  Patient presents to the ER with c/o RLQ pain. Work up shows patient has acute appendicitis. Dr. Benites was contacted and advised to admit to the hospitalist for surgery tomorrow. Spoke with Dr. Sosa who agrees to admission.     Acute appendicitis, unspecified acute appendicitis type: chronic illness or injury  Amount and/or Complexity of Data Reviewed  Labs: ordered.  Radiology:  ordered.      Risk  Prescription drug management.  Decision regarding hospitalization.          Final diagnoses:   Acute appendicitis, unspecified acute appendicitis type       ED Disposition  ED Disposition     ED Disposition   Decision to Admit    Condition   --    Comment   Level of Care: Med/Surg [1]   Diagnosis: Acute appendicitis, unspecified acute appendicitis type [[id number]]   Admitting Physician: RAMON MORGAN [[id number]]   Attending Physician: RAMON MORGAN [[id number]]               No follow-up provider specified.       Medication List      No changes were made to your prescriptions during this visit.          Dior Enriquez, APRN  03/23/23 4091

## 2023-03-24 NOTE — PLAN OF CARE
Goal Outcome Evaluation:  Plan of Care Reviewed With: patient        Progress: no change  Outcome Evaluation: LR bolus given per orders for BP, VSS, c/o pain managed with PRN pain medications per orders per patient request, I.V. abx infusing per orders, strict NPO diet maintained per orders, no other complaints, resting in between care.

## 2023-03-24 NOTE — ED NOTES
"Nursing report ED to floor  Yasmeen Sandoval  23 y.o.  female    HPI:   Chief Complaint   Patient presents with    Abdominal Pain    Vomiting       Admitting doctor:   Iain Sosa MD    Consulting provider(s):  Consults       No orders found from 2/22/2023 to 3/24/2023.             Admitting diagnosis:   The encounter diagnosis was Acute appendicitis, unspecified acute appendicitis type.    Code status:   Current Code Status       Date Active Code Status Order ID Comments User Context       Prior            Allergies:   Patient has no known allergies.    Intake and Output    Intake/Output Summary (Last 24 hours) at 3/23/2023 2105  Last data filed at 3/23/2023 2034  Gross per 24 hour   Intake 1000 ml   Output --   Net 1000 ml       Weight:       03/23/23 1743   Weight: 63.5 kg (140 lb)       Most recent vitals:   Vitals:    03/23/23 1743 03/23/23 1931   BP: 101/60 106/64   BP Location: Right arm Right arm   Patient Position: Sitting Sitting   Pulse: 63 55   Resp: 18 16   Temp: 98.4 °F (36.9 °C)    TempSrc: Oral    SpO2: 99% 100%   Weight: 63.5 kg (140 lb)    Height: 162.6 cm (64\")      Oxygen Therapy: room air    Active LDAs/IV Access:   Lines, Drains & Airways       Active LDAs       Name Placement date Placement time Site Days    Peripheral IV 03/23/23 1938 Right Antecubital 03/23/23 1938  Antecubital  less than 1                    Labs (abnormal labs have a star):   Labs Reviewed   COMPREHENSIVE METABOLIC PANEL - Abnormal; Notable for the following components:       Result Value    Sodium 132 (*)     Calcium 8.5 (*)     Alkaline Phosphatase 34 (*)     All other components within normal limits    Narrative:     GFR Normal >60  Chronic Kidney Disease <60  Kidney Failure <15     URINALYSIS W/ MICROSCOPIC IF INDICATED (NO CULTURE) - Abnormal; Notable for the following components:    Specific Gravity, UA 1.097 (*)     Ketones, UA Trace (*)     Protein, UA Trace (*)     All other components within " normal limits    Narrative:     Urine microscopic not indicated.   CBC WITH AUTO DIFFERENTIAL - Abnormal; Notable for the following components:    WBC 15.68 (*)     Monocyte % 4.8 (*)     Neutrophils, Absolute 9.58 (*)     Lymphocytes, Absolute 4.69 (*)     Eosinophils, Absolute 0.54 (*)     All other components within normal limits   LIPASE - Normal   HCG, SERUM, QUALITATIVE - Normal   LACTIC ACID, PLASMA   CBC AND DIFFERENTIAL    Narrative:     The following orders were created for panel order CBC & Differential.  Procedure                               Abnormality         Status                     ---------                               -----------         ------                     CBC Auto Differential[[id number]]        Abnormal            Final result                 Please view results for these tests on the individual orders.   EXTRA TUBES    Narrative:     The following orders were created for panel order Extra Tubes.  Procedure                               Abnormality         Status                     ---------                               -----------         ------                     Gold Top - SST[[id number]]                                   Final result               Light Blue Top[[id number]]                                   Final result                 Please view results for these tests on the individual orders.   GOLD TOP - SST   LIGHT BLUE TOP       Meds given in ED:   Medications   sodium chloride 0.9 % flush 10 mL (has no administration in time range)   piperacillin-tazobactam (ZOSYN) 3.375 g/100 mL 0.9% NS IVPB (mbp) (has no administration in time range)   sodium chloride 0.9 % bolus 1,000 mL (0 mL Intravenous Stopped 3/23/23 2034)   ondansetron (ZOFRAN) injection 4 mg (4 mg Intravenous Given 3/23/23 1939)   ketorolac (TORADOL) injection 30 mg (30 mg Intravenous Given 3/23/23 1939)   iopamidol (ISOVUE-300) 61 % injection 100 mL (100 mL Intravenous Given 3/23/23 1854)           NIH Stroke  Scale:       Isolation/Infection(s):  No active isolations   No active infections     COVID Testing  Collected no  Resulted n/a    Nursing report ED to floor:  Mental status: alert and oriented   Ambulatory status: selfer  Precautions: none    ED nurse phone [phone]- [phone]

## 2023-03-24 NOTE — CONSULTS
Referring Provider: Dr. aDvila hospitalist      Patient Care Team:  Simone Hutchinson APRN as PCP - General (Nurse Practitioner)  Divina Howard MA as Medical Assistant (Emergency Medicine)      Subjective .  Appendicitis    History of present illness: 23-year-old female presented to emergency room last evening with a 2-day history of a right lower quadrant pain with vomiting and some anorexia.  Patient states that she had similar problem to this in the distant past when she had kidney stones and thus what she thought she had.  Her white count of 17,000.  CT scan suggest acute appendicitis.  She has been given IV fluids overnight and IV Zosyn.  She feels better this morning.  White count is 15,000.  No prior abdominal surgery.  She denies pregnancy and she is currently on birth control.  Takes no other medication.  Does admit to vaping but does not smoke otherwise.    Review of Systems   Constitutional: Positive for appetite change. Negative for activity change, chills, fatigue and fever.   Respiratory: Negative for apnea, cough, chest tightness and shortness of breath.    Cardiovascular: Negative for chest pain.   Gastrointestinal: Positive for abdominal pain, nausea and vomiting. Negative for abdominal distention, constipation and diarrhea.   Genitourinary: Negative for difficulty urinating and dysuria.   Musculoskeletal: Positive for back pain. Negative for arthralgias.   Neurological: Negative for dizziness.   Psychiatric/Behavioral: Negative for agitation, behavioral problems and confusion.         History  Past Medical History:   Diagnosis Date   • GERD (gastroesophageal reflux disease)    • Headache    • Kidney stone    • Tobacco abuse    ,   Past Surgical History:   Procedure Laterality Date   • CYSTOSCOPY, URETEROSCOPY, RETROGRADE PYELOGRAM, STENT INSERTION Right 1/7/2023    Procedure: CYSTOSCOPY RIGHT URETEROSCOPY RETROGRADE PYELOGRAM HOLMIUM STENT INSERTION;  Surgeon: Seminole, Lawrence CHU MD;   Location: [facility];  Service: Urology;  Laterality: Right;   • NO PAST SURGERIES     ,   Family History   Problem Relation Age of Onset   • No Known Problems Mother    • Hypertension Father    • No Known Problems Sister    • No Known Problems Sister    • No Known Problems Sister    • No Known Problems Brother    • Hypertension Maternal Grandmother    • Kidney failure Maternal Grandmother    • No Known Problems Maternal Grandfather    • Hyperlipidemia Paternal Grandfather    ,   Social History     Tobacco Use   • Smoking status: Every Day     Types: Electronic Cigarette   • Smokeless tobacco: Former   Vaping Use   • Vaping Use: Every day   • Substances: Nicotine, Flavoring   • Devices: Disposable   Substance Use Topics   • Alcohol use: Yes     Alcohol/week: 2.0 standard drinks     Types: 1 Shots of liquor, 1 Drinks containing 0.5 oz of alcohol per week   • Drug use: No   , Home Medications:  Prior to Admission medications    Medication Sig Start Date End Date Taking? Authorizing Provider   norgestrel-ethinyl estradiol (LOW-OGESTREL,CRYSELLE) 0.3-30 MG-MCG per tablet Take 1 tablet by mouth Daily.   Yes ProviderJimenez MD   doxycycline (VIBRAMYCIN) 100 MG capsule Take 1 capsule by mouth Daily. 1/24/23   ProviderJimenez MD   , Scheduled Meds:  lactated ringers, 2,000 mL, Intravenous, Once  piperacillin-tazobactam, 3.375 g, Intravenous, Q8H  sodium chloride, 10 mL, Intravenous, Q12H    , Continuous Infusions:  lactated ringers, 150 mL/hr, Last Rate: 150 mL/hr (03/24/23 0503)  Pharmacy to Dose Zosyn,     , PRN Meds:  •  acetaminophen **OR** [DISCONTINUED] acetaminophen **OR** acetaminophen  •  droperidol  •  midodrine  •  Morphine **AND** naloxone  •  ondansetron **OR** ondansetron  •  Pharmacy to Dose Zosyn  •  [COMPLETED] Insert Peripheral IV **AND** sodium chloride  •  sodium chloride  •  sodium chloride and Allergies:  No Known Allergies    Objective     Vital Signs   Temp:  [97.7 °F (36.5 °C)-98.6 °F (37  °C)] 98.6 °F (37 °C)  Heart Rate:  [47-63] 47  Resp:  [16-20] 18  BP: ()/(48-68) 104/60    Physical Exam:     General Appearance:    Alert, cooperative, in no acute distress   Head:    Normocephalic, without obvious abnormality, atraumatic   Eyes:            Lids and lashes normal, conjunctivae and sclerae normal, no   icterus, no pallor, corneas clear   Ears:    Ears appear intact with no abnormalities noted       Neck:   No adenopathy, supple, trachea midline, no thyromegaly,   no JVD   Lungs:     Clear to auscultation,respirations regular, even and                  unlabored    Heart:    Regular rhythm and normal rate, normal S1 and S2, no            murmur, no gallop, no rub, no click       Abdomen:    Soft except for right lower quadrant tenderness no obvious masses or hernias.  Localized peritoneal signs and right lower quadrant   Extremities:   Moves all extremities well, no edema, no cyanosis, no             redness   Skin:   No bleeding, bruising or rash        Neurologic:  Grossly intact, sensation intact       Results Review:   I reviewed the patient's new clinical results.      Assessment & Plan       Acute appendicitis    Hyponatremia        I discussed the patient's findings and my recommendations with patient and nursing staff     Agree patient likely has acute appendicitis.  Fully discussed the diagnosis with the patient and discussed laparoscopic appendectomy versus continued treatment with antibiotics.  Patient clearly understands her options here and wishes to proceed with surgery.  I fully discussed laparoscopic appendectomy along with alternatives risk and benefits with her she clearly understands and wishes to proceed.  Keep n.p.o. plan on surgery later this morning.        This document has been electronically signed by Theodore Benites MD on March 24, 2023 06:42 CDT     Theodore Benites MD  03/24/23  06:42 CDT

## 2023-03-24 NOTE — PROGRESS NOTES
[facility] Medicine Services  INPATIENT PROGRESS NOTE    Length of Stay: 0  Date of Admission: 3/23/2023  Primary Care Physician: Simone Hutchinson APRN    Subjective   Chief Complaint: Abdominal pain  HPI:    Has been admitted for acute appendicitis.  Doing fair this morning, reports some pain but has been controlled.  Has some nausea as well.    Review of Systems   Respiratory: Negative for shortness of breath.    Cardiovascular: Negative for chest pain.   Gastrointestinal: Positive for abdominal pain.        All pertinent negatives and positives are as above. All other systems have been reviewed and are negative unless otherwise stated.     Objective    Temp:  [97 °F (36.1 °C)-98.6 °F (37 °C)] 97 °F (36.1 °C)  Heart Rate:  [46-63] 54  Resp:  [16-20] 16  BP: ()/(48-68) 97/49  Physical Exam  Vitals and nursing note reviewed.   Constitutional:       General: She is not in acute distress.     Appearance: She is well-developed. She is not diaphoretic.   HENT:      Head: Normocephalic and atraumatic.   Cardiovascular:      Rate and Rhythm: Normal rate.   Pulmonary:      Effort: Pulmonary effort is normal. No respiratory distress.      Breath sounds: No wheezing.   Abdominal:      General: There is no distension.      Palpations: Abdomen is soft.   Musculoskeletal:         General: Normal range of motion.   Skin:     General: Skin is warm and dry.   Neurological:      Mental Status: She is alert.      Cranial Nerves: No cranial nerve deficit.   Psychiatric:         Behavior: Behavior normal.         Thought Content: Thought content normal.         Judgment: Judgment normal.             Results Review:  I have reviewed the labs, radiology results, and diagnostic studies.    Laboratory Data:   Lab Results (last 24 hours)     Procedure Component Value Units Date/Time    Lactic Acid, Plasma [[id number]]  (Normal) Collected: 03/23/23 2112    Specimen: Blood Updated: 03/23/23  2132     Lactate 0.7 mmol/L     Urinalysis With Microscopic If Indicated (No Culture) - Urine, Clean Catch [[id number]]  (Abnormal) Collected: 03/23/23 2034    Specimen: Urine, Clean Catch Updated: 03/23/23 2051     Color, UA Yellow     Appearance, UA Clear     pH, UA 7.0     Specific Gravity, UA 1.097     Comment: Result obtained by Refractometer        Glucose, UA Negative     Ketones, UA Trace     Bilirubin, UA Negative     Blood, UA Negative     Protein, UA Trace     Leuk Esterase, UA Negative     Nitrite, UA Negative     Urobilinogen, UA 1.0 E.U./dL    Narrative:      Urine microscopic not indicated.    Extra Tubes [[id number]] Collected: 03/23/23 1935    Specimen: Blood Updated: 03/23/23 2046    Narrative:      The following orders were created for panel order Extra Tubes.  Procedure                               Abnormality         Status                     ---------                               -----------         ------                     Gold Top - SST[[id number]]                                   Final result               Light Blue Top[[id number]]                                   Final result                 Please view results for these tests on the individual orders.    Gold Top - SST [[id number]] Collected: 03/23/23 1935    Specimen: Blood Updated: 03/23/23 2046     Extra Tube Hold for add-ons.     Comment: Auto resulted.       Light Blue Top [[id number]] Collected: 03/23/23 1935    Specimen: Blood Updated: 03/23/23 2046     Extra Tube Hold for add-ons.     Comment: Auto resulted       hCG, Serum, Qualitative [[id number]]  (Normal) Collected: 03/23/23 1935    Specimen: Blood Updated: 03/23/23 2026     HCG Qualitative Negative    Comprehensive Metabolic Panel [[id number]]  (Abnormal) Collected: 03/23/23 1935    Specimen: Blood Updated: 03/23/23 2004     Glucose 78 mg/dL      BUN 11 mg/dL      Creatinine 0.70 mg/dL      Sodium 132 mmol/L      Potassium 3.7 mmol/L      Chloride 101 mmol/L      CO2 22.0  mmol/L      Calcium 8.5 mg/dL      Total Protein 6.4 g/dL      Albumin 3.9 g/dL      ALT (SGPT) 15 U/L      AST (SGOT) 13 U/L      Alkaline Phosphatase 34 U/L      Total Bilirubin 0.3 mg/dL      Globulin 2.5 gm/dL      A/G Ratio 1.6 g/dL      BUN/Creatinine Ratio 15.7     Anion Gap 9.0 mmol/L      eGFR 125.6 mL/min/1.73     Narrative:      GFR Normal >60  Chronic Kidney Disease <60  Kidney Failure <15      Lipase [[id number]]  (Normal) Collected: 03/23/23 1935    Specimen: Blood Updated: 03/23/23 2004     Lipase 26 U/L     CBC & Differential [[id number]]  (Abnormal) Collected: 03/23/23 1935    Specimen: Blood Updated: 03/23/23 1949    Narrative:      The following orders were created for panel order CBC & Differential.  Procedure                               Abnormality         Status                     ---------                               -----------         ------                     CBC Auto Differential[[id number]]        Abnormal            Final result                 Please view results for these tests on the individual orders.    CBC Auto Differential [[id number]]  (Abnormal) Collected: 03/23/23 1935    Specimen: Blood Updated: 03/23/23 1949     WBC 15.68 10*3/mm3      RBC 4.20 10*6/mm3      Hemoglobin 12.4 g/dL      Hematocrit 36.2 %      MCV 86.2 fL      MCH 29.5 pg      MCHC 34.3 g/dL      RDW 12.5 %      RDW-SD 38.9 fl      MPV 9.9 fL      Platelets 273 10*3/mm3      Neutrophil % 61.2 %      Lymphocyte % 29.9 %      Monocyte % 4.8 %      Eosinophil % 3.4 %      Basophil % 0.4 %      Immature Grans % 0.3 %      Neutrophils, Absolute 9.58 10*3/mm3      Lymphocytes, Absolute 4.69 10*3/mm3      Monocytes, Absolute 0.75 10*3/mm3      Eosinophils, Absolute 0.54 10*3/mm3      Basophils, Absolute 0.07 10*3/mm3      Immature Grans, Absolute 0.05 10*3/mm3      nRBC 0.0 /100 WBC           Culture Data:   No results found for: BLOODCX  No results found for: URINECX  No results found for: RESPCX  No results found  for: WOUNDCX  No results found for: STOOLCX  No components found for: BODYFLD    Radiology Data:   Imaging Results (Last 24 Hours)     Procedure Component Value Units Date/Time    XR Abdomen 2+ VW with Chest 1 VW [[id number]] Collected: 03/24/23 0449     Updated: 03/24/23 0547    Narrative:      ACUTE ABDOMINAL SERIES    HISTORY: ? perforation - known appendicitis, K35.80 Unspecified  acute appendicitis    COMPARISON: None.    FINDINGS: Single PA view of the chest was obtained. The lungs are  well expanded and appear clear. Heart size is normal.    Flat and upright views of the abdomen were obtained. Patchy areas  of small and large bowel gas are present in a nonspecific,  nonobstructive pattern.  There is no pneumoperitoneum.     There is no significant constipation appreciated by plain film  technique.     Regional osseous structures are intact. There is some residual  contrast in the renal collecting systems bilaterally without  hydronephrosis. There are no suspicious calculi.      Impression:      1. Nonspecific, nonobstructive bowel gas pattern.    Electronically signed by:  Slim Roberson MD  3/24/2023 5:45 AM  CDT Workstation: [id number]    CT Abdomen Pelvis With Contrast [[id number]] Collected: 03/23/23 1853     Updated: 03/23/23 2251    Addenda:         ADDENDUM   ADDENDUM #1       Findings discussed with Dr. Reynaga at 742 pm.    Electronically signed by:  Martínez Ramos MD  3/23/2023 10:48 PM CDT  Workstation: [id number]    Signed: 03/23/23 2248 by Martínez Ramos MD    Narrative:      EXAM:  CT ABDOMEN PELVIS WITH IV CONTRAST    ORDERING PROVIDER:  BALTA LAWTON    CLINICAL HISTORY:  right lower quadrant pain    COMPARISON:      TECHNIQUE:   CT abdomen and pelvis performed with 90ml of Isovue-300 as IV  contrast and reformatted in the sagittal and coronal planes.     This examination was performed according to our departmental dose  optimization program which includes automated exposure  coentrol,  adjustment of the MA and kV according to patient size, and/or use  of iterative reconstruction technique.     FINDINGS:    BASILAR CHEST: No consolidation, nodule or effusion.     LIVER: No mass, enlargement or abnormal density.     BILIARY TRACT: Unremarkable gallbladder.     SPLEEN: No mass or enlargement.     PANCREAS: No mass or inflammatory process. Normal pancreatic  duct.     ADRENAL GLANDS: Unremarkable. No mass.     URINARY SYSTEM: Kidneys are normal in size. No stone,  hydronephrosis, or mass. Normal ureters.  Bladder is normal  without mass or stone.      GI TRACT: No mass, dilation, or wall thickening.  No diverticula.   No hernia.  Appendix is abnormal best visualized on coronal  image 24 of series 4 concerning for acute appendicitis.      REPRODUCTIVE SYSTEM: Unremarkable    PERITONEAL SPACE:No free air, but with small amount of pelvic  free fluid, and no gross mass or adenopathy.     RETROPERITONEAL SPACE:  No adenopathy, mass, aneurysm or  significant vascular abnormality.     BONES AND EXTRA-ABDOMINAL SOFT TISSUES: No aggressive osseous  lesion.  No inguinal adenopathy or hernia.      Impression:      Acute appendicitis.  No gross abscess.  Small amount of pelvic free fluid.    Electronically signed by:  Martínez Ramos MD  3/23/2023 7:39 PM CDT  Workstation: [id number]    US Non-ob Transvaginal [[id number]] Collected: 03/23/23 1848     Updated: 03/23/23 2015    Narrative:      EXAM:   US PELVIS TRANSVAGINAL    ORDERING PROVIDER:  BALTA LAWTON    CLINICAL HISTORY:  Right lower quadrant pain    COMPARISON STUDY:       TECHNIQUE:   real-time 2-dimensional grayscale, color Doppler with spectral  analysis of the arterial and venous blood supply was performed of  each ovary.  Ultrasound evaluation of the uterus was also  obtained.    FINDINGS:    UTERUS:  Normal in size measuring 7.1 x 3.3 x 5.1 cm . Myometrial  echotexture is within normal.  No fibroids are noted.    ENDOMETRIAL ECHO:  3 mm  and is unremarkable without mass, cyst or  fluid.     RIGHT OVARY: 2.3 x 3.0 x 1.4cm. No mass or suspicious cyst.  Normal color and spectral waveforms are seen within the arterial  and venous blood supply of the right ovary.    LEFT OVARY:  2.6 x 1.4 x 2.2cm. No mass or suspicious cyst.   Normal color and spectral waveforms are seen within the arterial  and venous blood supply of the left ovary.    ADNEXA: No masses. Small amount of pelvic free fluid.      Impression:      Small amount of pelvic free fluid.  Normal bilateral ovaries and uterus.    Electronically signed by:  Martínez Ramos MD  3/23/2023 8:13 PM CDT  Workstation: [id number]          I have reviewed the patient's current medications.     Assessment/Plan     Active Hospital Problems    Diagnosis    • **Acute appendicitis    • Hyponatremia      Acute appendicitis-continue to monitor, surgery planned for today, we will continue with supportive care    Hyponatremia-mild-continue to monitor    DVT prophylaxis-SCD    Medical Decision Making  Number and Complexity of problems: 1 major complex, 1 minor  Differential Diagnosis: Sepsis    Conditions and Status:        Condition is unchanged.     [facility] Data  External documents reviewed: Previous medical records  My EKG interpretation: None  My CT interpretation: Acute appendicitis  Tests considered but not ordered: None     Decision rules/scores evaluated (example [id number], Wells, etc): None     Discussed with: Surgery     Treatment Plan  As above    Care Planning  Shared decision making: Patient  Code status and discussions: Full code    Disposition  Social Determinants of Health that impact treatment or disposition: None  I expect the patient to be discharged to home in 1-2 days.      I have utilized all available immediate resources to obtain, update, or review the patient's current medications (including all prescriptions, over-the-counter products, herbals, cannabis/cannabidiol products, and  vitamin/mineral/dietary (nutritional) supplements).     I confirmed that the patient's Advance Care Plan is present, code status is documented, or surrogate decision maker is listed in the patient's medical record.         Ihsan Guzman MD   03/24/23   11:59 CDT

## 2023-03-24 NOTE — THERAPY RE-EVALUATION
Physical Exam  Vitals and nursing note reviewed.   Constitutional:       General: She is awake. She is not in acute distress.     Appearance: She is normal weight. She is ill-appearing. She is not toxic-appearing or diaphoretic.   Cardiovascular:      Rate and Rhythm: Normal rate and regular rhythm.      Pulses: Normal pulses.      Heart sounds: Normal heart sounds. No murmur heard.    No friction rub. No gallop.   Pulmonary:      Effort: Pulmonary effort is normal. No respiratory distress.      Breath sounds: Normal breath sounds. No stridor. No wheezing, rhonchi or rales.   Abdominal:      General: Abdomen is flat. There is no distension.      Palpations: Abdomen is soft. There is no mass.      Tenderness: There is abdominal tenderness in the right lower quadrant. There is no right CVA tenderness, left CVA tenderness, guarding or rebound. Positive signs include Rovsing's sign.      Hernia: No hernia is present.      Comments: No signs of peritonism at this time   Skin:     Coloration: Skin is not jaundiced.      Findings: No bruising, erythema or rash.   Neurological:      Mental Status: She is alert.   Psychiatric:         Behavior: Behavior is cooperative.       Vitals:    03/23/23 2309 03/23/23 2311 03/24/23 0419 03/24/23 0430   BP: 100/56 97/53 (!) 87/50 (!) 80/60   BP Location: Right arm Left arm Left arm Left arm   Patient Position: Lying Lying Lying Lying   Pulse: 59  52    Resp: 18  18    Temp: 98.4 °F (36.9 °C)  98.6 °F (37 °C)    TempSrc: Temporal  Temporal    SpO2: 100%  97%    Weight:   63.5 kg (140 lb)    Height:         I was asked to reassess the patient by the nursing staff as the patient's vitals have steadily deteriorated over the course of the evening and into the morning.  The patient still feels very much the same with pain in her right lower quadrant/groin.    Plan:  1.  Stat abdominal x-ray 3 series view to assess for any peritoneal or obvious intra-abdominal pathology  2.  IV fluid  boluses  3.  As needed midodrine  4.  I have informed the ICU about the status of the patient but at this time we will see if we can keep the patient on the floor  5.  I will contact the general surgeon in the morning with the patient's current clinical status so they are aware  6.  Have informed the nursing staff to start performing every 30 minute vitals and I will stay in close touch with them to ensure the patient is stable    Electronically signed by Iain Sosa MD, 03/24/23, 7:03 AM CDT.

## 2023-03-24 NOTE — OP NOTE
APPENDECTOMY LAPAROSCOPIC  Procedure Note    Yasmeen Sandoval  3/24/2023    Pre-op Diagnosis:   Acute appendicitis with localized peritonitis, without perforation or abscess, unspecified whether gangrene present [K35.30]    Post-op Diagnosis:     Post-Op Diagnosis Codes:     * Acute appendicitis with localized peritonitis, without perforation or abscess, unspecified whether gangrene present [K35.30]    Procedure/CPT® Codes:      Procedure(s):  APPENDECTOMY LAPAROSCOPIC    Surgeon(s):  Theodore Benites MD    Anesthesia: General    Staff:   Circulator: Edd Matson RN; Brenda Justice RN  Scrub Person: Lawrence Agarwal  Endo Technician: Cassie Riggins CST  Assistant: Madeline Naqvi CSA    Assistant: Madeline Naqvi CSA was responsible for performing the following activities: Retraction, Suction, Irrigation, Suturing, Closing and Placing Dressing and their skilled assistance was necessary for the success of this case.     Estimated Blood Loss: minimal    Specimens:                ID Type Source Tests Collected by Time   A :  Tissue Large Intestine, Appendix TISSUE EXAM, [facility] ([facility]) Theodore Benites MD 3/24/2023 1154         Drains:   NG/OG Tube Orogastric 18 Fr (Active)       Urethral Catheter Double-lumen 16 Fr. (Active)       Indications: 23-year-old female presented to the emergency room with right lower quadrant pain work-up is consistent with appendicitis.  Patient presents for laparoscopic appendectomy    Findings: Acute appendicitis no perforation normal laparotomy otherwise    Complications: None     Procedure: The patient was brought to the operating room. She had received Zosyn IV antibiotics and IV fluids, and was brought to the operating room where she was placed under general endotracheal anesthesia without any difficulty. She had SCD's in place. Mao catheter was placed. Her abdomen was prepped and draped. Appropriate timeout was taken. Every one was in agreement.   Cutdown was performed at the umbilical position. A 10/11 Duran trocar was placed without any difficulty. TAP abdominal wall block was performed with 30 mL of 0.5% Marcaine with 20 mL injected on the right side of the abdomen and 10 mL injected on the left side. A 5 mm trocar was placed in the right upper quadrant and another 5 mm trocar was placed in the lower midline. Camera was moved to the lower midline. Overall exploration of the abdomen was unremarkable. Gallbladder and bowel appeared to be normal. Pelvis appeared to be normal as well. The appendix was easily identified, it was curled back on itself. We took retroperitoneal attachments down, freed up the appendix from the tip to the base. Mesoappendix was taken with the Harmonic scalpel. A #1 PDS Endoloop was used to secure the base of the appendix, and then the appendix was divided and placed in Endo Catch bag and removed without difficulty. Good hemostasis was noted. Trocars were removed. Fascia was closed at cutdown site with 2-0 Vicryl and figure-of-eight stitch. The skin was closed at all sites with 4-0 Monocryl subcuticular stitch. Glue was used for final skin closure at all sites. The patient was awakened, extubated and transferred to the recovery room in awake, stable condition.                Disposition: To recovery in stable condition        Theodore Benites MD     Date: 3/24/2023  Time: 13:02 CDT

## 2023-03-25 ENCOUNTER — READMISSION MANAGEMENT (OUTPATIENT)
Dept: CALL CENTER | Facility: HOSPITAL | Age: 23
End: 2023-03-25
Payer: COMMERCIAL

## 2023-03-25 VITALS
OXYGEN SATURATION: 97 % | SYSTOLIC BLOOD PRESSURE: 110 MMHG | BODY MASS INDEX: 23.9 KG/M2 | HEIGHT: 64 IN | DIASTOLIC BLOOD PRESSURE: 59 MMHG | WEIGHT: 140 LBS | RESPIRATION RATE: 16 BRPM | HEART RATE: 72 BPM | TEMPERATURE: 98.4 F

## 2023-03-25 PROCEDURE — G0378 HOSPITAL OBSERVATION PER HR: HCPCS

## 2023-03-25 RX ORDER — HYDROCODONE BITARTRATE AND ACETAMINOPHEN 5; 325 MG/1; MG/1
1 TABLET ORAL EVERY 6 HOURS PRN
Qty: 12 TABLET | Refills: 0 | Status: SHIPPED | OUTPATIENT
Start: 2023-03-25

## 2023-03-25 RX ORDER — ONDANSETRON 4 MG/1
4 TABLET, FILM COATED ORAL EVERY 6 HOURS PRN
Qty: 30 TABLET | Refills: 0 | Status: SHIPPED | OUTPATIENT
Start: 2023-03-25

## 2023-03-25 RX ADMIN — SODIUM CHLORIDE, POTASSIUM CHLORIDE, SODIUM LACTATE AND CALCIUM CHLORIDE 75 ML/HR: 600; 310; 30; 20 INJECTION, SOLUTION INTRAVENOUS at 03:28

## 2023-03-25 NOTE — PLAN OF CARE
Goal Outcome Evaluation:  Plan of Care Reviewed With: patient        Progress: improving  Outcome Evaluation: VSS. Pt resting well between care. Pt requested pain med once this shift. Will continue to monitor.

## 2023-03-25 NOTE — DISCHARGE SUMMARY
"    [facility] Medicine Services  DISCHARGE SUMMARY       Date of Admission: 3/23/2023  Date of Discharge:  3/25/2023  Primary Care Physician: Simone Hutchinson APRN    Presenting Problem/History of Present Illness:  Acute appendicitis, unspecified acute appendicitis type [K35.80]       Final Discharge Diagnoses:  Active Hospital Problems    Diagnosis    • **Acute appendicitis    • Hyponatremia        Consults:   Consults     Date and Time Order Name Status Description    3/23/2023  9:54 PM Inpatient General Surgery Consult Completed           Procedures Performed: Procedure(s):  APPENDECTOMY LAPAROSCOPIC                    Chief Complaint on Day of Discharge: None    Hospital Course:  The patient is a 23 y.o. female who presented to [facility] with This patient developed right lower quadrant abdominal pain yesterday which has been intermittent but more persistent today felt as a dull ache the majority of the time but with increased severity and a waxing waning picture.  She has had associated nausea and vomiting, or reduced urine output, reduced p.o. intake and has not had a bowel movement for over 24 hours.  Otherwise, the patient has no other systemic symptoms on review.     Patient has blood work there is markable for a sodium of 132, calcium 8.5, and white blood cell count 15.68.  The patient CT Abdo pelvis with contrast demonstrates an acute appendicitis  General surgery was consulted and later on she had laparoscopic appendectomy.  Patient tolerated procedure fine.  She was also tolerating diet afterwards fine.  General surgery also cleared the patient to go home and she was discharged home with outpatient follow-up with PCP and general surgery.    Condition on Discharge: Stable    Physical Exam on Discharge:  /59 (BP Location: Left arm, Patient Position: Lying)   Pulse 72   Temp 98.4 °F (36.9 °C) (Temporal)   Resp 16   Ht 162.6 cm (64\")   Wt " 63.5 kg (140 lb)   LMP 03/10/2023 (Exact Date) Comment: hcg negative  SpO2 97%   BMI 24.03 kg/m²   Physical Exam  Vitals and nursing note reviewed.   Constitutional:       General: She is not in acute distress.     Appearance: She is well-developed. She is not diaphoretic.   HENT:      Head: Normocephalic and atraumatic.   Cardiovascular:      Rate and Rhythm: Normal rate.   Pulmonary:      Effort: Pulmonary effort is normal. No respiratory distress.      Breath sounds: No wheezing.   Abdominal:      General: There is no distension.      Palpations: Abdomen is soft.   Musculoskeletal:         General: Normal range of motion.   Skin:     General: Skin is warm and dry.   Neurological:      Mental Status: She is alert.      Cranial Nerves: No cranial nerve deficit.   Psychiatric:         Behavior: Behavior normal.         Thought Content: Thought content normal.         Judgment: Judgment normal.           Discharge Disposition:  Home or Self Care    Discharge Medications:     Discharge Medications      New Medications      Instructions Start Date   HYDROcodone-acetaminophen 5-325 MG per tablet  Commonly known as: NORCO   1 tablet, Oral, Every 6 Hours PRN      ondansetron 4 MG tablet  Commonly known as: ZOFRAN   4 mg, Oral, Every 6 Hours PRN         Stop These Medications    doxycycline 100 MG capsule  Commonly known as: VIBRAMYCIN     norgestrel-ethinyl estradiol 0.3-30 MG-MCG per tablet  Commonly known as: LOW-OGESTREL,CRYSELLE            Discharge Diet:   Diet Instructions     Diet: Regular/House Diet; Texture: Regular Texture (IDDSI 7); Fluid Consistency: Thin (IDDSI 0)      Discharge Diet: Regular/House Diet    Texture: Regular Texture (IDDSI 7)    Fluid Consistency: Thin (IDDSI 0)          Activity at Discharge:   Activity Instructions     Activity as Tolerated            Discharge Care Plan/Instructions: Follow-up with PCP and general surgery    Follow-up Appointments:   Follow-up with PCP and general  surgery  Test Results Pending at Discharge:   Pending Labs     Order Current Status    TISSUE EXAM, [facility]) In process          Ihsan Guzman MD  03/25/23  10:20 CDT    Time: 40 minutes

## 2023-03-27 ENCOUNTER — TRANSITIONAL CARE MANAGEMENT TELEPHONE ENCOUNTER (OUTPATIENT)
Dept: CALL CENTER | Facility: HOSPITAL | Age: 23
End: 2023-03-27
Payer: COMMERCIAL

## 2023-03-27 LAB — REF LAB TEST METHOD: NORMAL

## 2023-03-27 NOTE — OUTREACH NOTE
Call Center TCM Note    Flowsheet Row Responses   [facility] patient discharged from? [place]   Does the patient have one of the following disease processes/diagnoses(primary or secondary)? General Surgery   TCM attempt successful? Yes   Call start time 1212   Call end time 1216   Discharge diagnosis APPENDECTOMY LAPAROSCOPIC   Meds reviewed with patient/caregiver? Yes   Is the patient having any side effects they believe may be caused by any medication additions or changes? No   Does the patient have all medications related to this admission filled (includes all antibiotics, pain medications, etc.) Yes   Is the patient taking all medications as directed (includes completed medication regime)? Yes   Comments HOSP DC FU appt 3/29/23 @ 1015 am.    Does the patient have an appointment with their PCP within 7 days of discharge? Yes   Has home health visited the patient within 72 hours of discharge? N/A   Psychosocial issues? No   Did the patient receive a copy of their discharge instructions? Yes   Nursing interventions Reviewed instructions with patient   What is the patient's perception of their health status since discharge? Improving   Nursing interventions Nurse provided patient education   Is the patient /caregiver able to teach back basic post-op care? Take showers only when approved by MD-sponge bathe until then, No tub bath, swimming, or hot tub until instructed by MD, Lifting as instructed by MD in discharge instructions, Drive as instructed by MD in discharge instructions   Is the patient/caregiver able to teach back signs and symptoms of incisional infection? Increased redness, swelling or pain at the incisonal site, Increased drainage or bleeding, Incisional warmth, Pus or odor from incision, Fever   Is the patient/caregiver able to teach back steps to recovery at home? Eat a well-balance diet, Set small, achievable goals for return to baseline health, Rest and rebuild strength, gradually  increase activity   If the patient is a current smoker, are they able to teach back resources for cessation? [id number]   Is the patient/caregiver able to teach back the hierarchy of who to call/visit for symptoms/problems? PCP, Specialist, Home health nurse, Urgent Care, ED, 911 Yes   TCM call completed? Yes   Wrap up additional comments Pt reports she is doing well at this time.    Call end time 1216          Radha Urias RN    3/27/2023, 12:17 CDT

## 2023-03-29 ENCOUNTER — OFFICE VISIT (OUTPATIENT)
Dept: FAMILY MEDICINE CLINIC | Facility: CLINIC | Age: 23
End: 2023-03-29
Payer: COMMERCIAL

## 2023-03-29 ENCOUNTER — LAB (OUTPATIENT)
Dept: LAB | Facility: HOSPITAL | Age: 23
End: 2023-03-29
Payer: COMMERCIAL

## 2023-03-29 VITALS
TEMPERATURE: 99 F | BODY MASS INDEX: 23.29 KG/M2 | WEIGHT: 136.4 LBS | OXYGEN SATURATION: 98 % | DIASTOLIC BLOOD PRESSURE: 64 MMHG | HEART RATE: 84 BPM | SYSTOLIC BLOOD PRESSURE: 108 MMHG | HEIGHT: 64 IN

## 2023-03-29 DIAGNOSIS — K35.30 ACUTE APPENDICITIS WITH LOCALIZED PERITONITIS, WITHOUT PERFORATION OR ABSCESS, UNSPECIFIED WHETHER GANGRENE PRESENT: ICD-10-CM

## 2023-03-29 DIAGNOSIS — Z09 HOSPITAL DISCHARGE FOLLOW-UP: Primary | ICD-10-CM

## 2023-03-29 LAB
ALBUMIN SERPL-MCNC: 4.4 G/DL (ref 3.5–5.2)
ALBUMIN/GLOB SERPL: 1.5 G/DL
ALP SERPL-CCNC: 35 U/L (ref 39–117)
ALT SERPL W P-5'-P-CCNC: 24 U/L (ref 1–33)
ANION GAP SERPL CALCULATED.3IONS-SCNC: 12 MMOL/L (ref 5–15)
AST SERPL-CCNC: 20 U/L (ref 1–32)
BASOPHILS # BLD AUTO: 0.08 10*3/MM3 (ref 0–0.2)
BASOPHILS NFR BLD AUTO: 0.7 % (ref 0–1.5)
BILIRUB SERPL-MCNC: <0.2 MG/DL (ref 0–1.2)
BUN SERPL-MCNC: 13 MG/DL (ref 6–20)
BUN/CREAT SERPL: 17.1 (ref 7–25)
CALCIUM SPEC-SCNC: 9.8 MG/DL (ref 8.6–10.5)
CHLORIDE SERPL-SCNC: 102 MMOL/L (ref 98–107)
CO2 SERPL-SCNC: 26 MMOL/L (ref 22–29)
CREAT SERPL-MCNC: 0.76 MG/DL (ref 0.57–1)
DEPRECATED RDW RBC AUTO: 38 FL (ref 37–54)
EGFRCR SERPLBLD CKD-EPI 2021: 113.8 ML/MIN/1.73
EOSINOPHIL # BLD AUTO: 0.71 10*3/MM3 (ref 0–0.4)
EOSINOPHIL NFR BLD AUTO: 6.5 % (ref 0.3–6.2)
ERYTHROCYTE [DISTWIDTH] IN BLOOD BY AUTOMATED COUNT: 12 % (ref 12.3–15.4)
GLOBULIN UR ELPH-MCNC: 2.9 GM/DL
GLUCOSE SERPL-MCNC: 73 MG/DL (ref 65–99)
HCT VFR BLD AUTO: 43.6 % (ref 34–46.6)
HGB BLD-MCNC: 14.9 G/DL (ref 12–15.9)
IMM GRANULOCYTES # BLD AUTO: 0.03 10*3/MM3 (ref 0–0.05)
IMM GRANULOCYTES NFR BLD AUTO: 0.3 % (ref 0–0.5)
LYMPHOCYTES # BLD AUTO: 2.92 10*3/MM3 (ref 0.7–3.1)
LYMPHOCYTES NFR BLD AUTO: 26.9 % (ref 19.6–45.3)
MCH RBC QN AUTO: 30 PG (ref 26.6–33)
MCHC RBC AUTO-ENTMCNC: 34.2 G/DL (ref 31.5–35.7)
MCV RBC AUTO: 87.7 FL (ref 79–97)
MONOCYTES # BLD AUTO: 0.58 10*3/MM3 (ref 0.1–0.9)
MONOCYTES NFR BLD AUTO: 5.3 % (ref 5–12)
NEUTROPHILS NFR BLD AUTO: 6.53 10*3/MM3 (ref 1.7–7)
NEUTROPHILS NFR BLD AUTO: 60.3 % (ref 42.7–76)
NRBC BLD AUTO-RTO: 0.1 /100 WBC (ref 0–0.2)
PLATELET # BLD AUTO: 448 10*3/MM3 (ref 140–450)
PMV BLD AUTO: 10.1 FL (ref 6–12)
POTASSIUM SERPL-SCNC: 4.5 MMOL/L (ref 3.5–5.2)
PROT SERPL-MCNC: 7.3 G/DL (ref 6–8.5)
RBC # BLD AUTO: 4.97 10*6/MM3 (ref 3.77–5.28)
SODIUM SERPL-SCNC: 140 MMOL/L (ref 136–145)
WBC NRBC COR # BLD: 10.85 10*3/MM3 (ref 3.4–10.8)

## 2023-03-29 PROCEDURE — 80053 COMPREHEN METABOLIC PANEL: CPT

## 2023-03-29 PROCEDURE — 36415 COLL VENOUS BLD VENIPUNCTURE: CPT

## 2023-03-29 PROCEDURE — 85025 COMPLETE CBC W/AUTO DIFF WBC: CPT

## 2023-03-29 NOTE — PROGRESS NOTES
Transitional Care Follow Up Visit  Subjective     Yasmeen Sandoval is a 23 y.o. female who presents for a transitional care management visit.    Within 48 business hours after discharge our office contacted her via telephone to coordinate her care and needs.      I reviewed and discussed the details of that call along with the discharge summary, hospital problems, inpatient lab results, inpatient diagnostic studies, and consultation reports with Yasmeen.     Current outpatient and discharge medications have been reconciled for the patient.  Reviewed by: ANNY Lai      Date of TCM Phone Call 3/25/2023   [facility]   Date of Admission 3/23/2023   Date of Discharge 3/25/2023   Discharge Disposition Home or Self Care     Risk for Readmission (LACE) Score: 4 (3/25/2023  5:01 AM)      History of Present Illness   Course During Hospital Stay: Ms. Sandoval is a 23-year-old female who presents today for hospital follow-up after acute appendicitis requiring appendectomy.  Patient presented to the ER with progressively worsening right lower quadrant pain.  CT examination revealed acute appendicitis.  Patient was admitted overnight and taken to the OR the next day.  Laparoscopic appendectomy was completed without complication.  Patient tolerated procedure well and was discharged home.  Patient reports she had fever approximately 1 day postop as high as 102 °F that resolved with Tylenol and did not return.  She is afebrile today.  Denies constipation, cough, shortness of breath, sputum production, fever or chills.  Pain well controlled and typically only needing Norco at bedtime.           The following portions of the patient's history were reviewed and updated as appropriate: allergies, current medications, past family history, past medical history, past social history, past surgical history and problem list.    Review of Systems   Constitutional: Negative.  Negative for  activity change, appetite change, chills, diaphoresis, fatigue, fever and unexpected weight change.   HENT: Negative.    Eyes: Negative.    Respiratory: Negative.  Negative for cough and shortness of breath.    Cardiovascular: Negative.  Negative for chest pain, palpitations and leg swelling.   Gastrointestinal: Positive for abdominal pain. Negative for abdominal distention, anal bleeding, blood in stool, constipation, diarrhea, nausea, rectal pain and vomiting.   Endocrine: Negative.    Genitourinary: Negative.    Skin: Negative.    Neurological: Negative.    Hematological: Negative.    Psychiatric/Behavioral: Negative.        Objective   Physical Exam  Vitals and nursing note reviewed.   Constitutional:       General: She is not in acute distress.     Appearance: Normal appearance. She is well-developed and normal weight. She is not ill-appearing, toxic-appearing or diaphoretic.   HENT:      Head: Normocephalic and atraumatic.   Eyes:      Conjunctiva/sclera: Conjunctivae normal.   Cardiovascular:      Rate and Rhythm: Normal rate and regular rhythm.      Heart sounds: Normal heart sounds.   Pulmonary:      Effort: Pulmonary effort is normal. No respiratory distress.      Breath sounds: Normal breath sounds. No stridor. No wheezing, rhonchi or rales.      Comments: Lungs clear  Abdominal:      Tenderness: There is generalized abdominal tenderness (Mild postop pain.  3-year laparoscopic incision sites clean dry and intact with surgical glue.  Bowel sounds normal active.).       Musculoskeletal:         General: No tenderness. Normal range of motion.      Cervical back: Normal range of motion.   Skin:     General: Skin is warm and dry.      Coloration: Skin is not pale.      Findings: No erythema or rash.   Neurological:      Mental Status: She is alert and oriented to person, place, and time.   Psychiatric:         Behavior: Behavior normal.         Thought Content: Thought content normal.         Judgment:  Judgment normal.         Assessment & Plan   Diagnoses and all orders for this visit:    1. Hospital discharge follow-up (Primary)    2. Acute appendicitis with localized peritonitis, without perforation or abscess, unspecified whether gangrene present  -     CBC & Differential; Future  -     Comprehensive Metabolic Panel; Future   - Will call with lab results.  Patient appears to be recovering appropriately after appendectomy.  Patient denies constipation.  Lungs clear on exam.  Patient did have a one-time episode of fever after being discharged home but no other episodes.  We will repeat CBC and CMP to ensure these have normalized and/or trending towards normal.  Follow-up with general surgery as scheduled.  Return to the ER for any complications.    3.  Follow-up as scheduled or sooner for any acute needs.            This document has been electronically signed by ANNY Lai on March 29, 2023 11:44 CDT

## 2023-03-30 NOTE — PROGRESS NOTES
Sodium has returned within normal limits as has calcium.  White blood cell count elevated but trending downward.  We will recheck at follow-up.  Follow-up with general surgery as scheduled.

## 2023-04-03 ENCOUNTER — OFFICE VISIT (OUTPATIENT)
Dept: SURGERY | Facility: CLINIC | Age: 23
End: 2023-04-03
Payer: COMMERCIAL

## 2023-04-03 VITALS
SYSTOLIC BLOOD PRESSURE: 108 MMHG | HEART RATE: 65 BPM | WEIGHT: 142.1 LBS | BODY MASS INDEX: 24.26 KG/M2 | HEIGHT: 64 IN | DIASTOLIC BLOOD PRESSURE: 66 MMHG | OXYGEN SATURATION: 95 % | TEMPERATURE: 99.1 F

## 2023-04-03 DIAGNOSIS — K35.30 ACUTE APPENDICITIS WITH LOCALIZED PERITONITIS, WITHOUT PERFORATION OR ABSCESS, UNSPECIFIED WHETHER GANGRENE PRESENT: Primary | ICD-10-CM

## 2023-04-03 PROCEDURE — 99024 POSTOP FOLLOW-UP VISIT: CPT | Performed by: SURGERY

## 2023-04-03 NOTE — PROGRESS NOTES
23-year-old female who is a week and a half out from her laparoscopic appendectomy for acute suppurative appendicitis.  Patient is doing well well.  She has no complaints.  She denies fever chills.  She has normal bowel function and tolerating a regular diet.  All of her incisions are clean and intact.  I went over pathology with her answered all of her questions.  Patient will follow up with us on appearing basis

## 2023-04-03 NOTE — LETTER
April 3, 2023     Patient: Yasmeen Sandoval   YOB: 2000   Date of Visit: 4/3/2023       To Whom It May Concern:    It is my medical opinion that Yasmeen Sandoval may return to work on 4-5-2023.No restrictions           Sincerely,          This document has been electronically signed by Theodore Benites MD on April 3, 2023 13:34 CDT        Theodore Benites MD    CC:   No Recipients

## (undated) DEVICE — ADAPT/Y SCPE GATEWAY ADVNTGE

## (undated) DEVICE — SOL IRR H2O BTL 1000ML STRL

## (undated) DEVICE — SYS IRR PUMP SGL ACTN VAC SYR 10CC

## (undated) DEVICE — STERILE POLYISOPRENE POWDER-FREE SURGICAL GLOVES WITH EMOLLIENT COATING: Brand: PROTEXIS

## (undated) DEVICE — PK CYSTO LF 60

## (undated) DEVICE — NITINOL WIRE WITH HYDROPHILIC TIP: Brand: SENSOR

## (undated) DEVICE — SOL PVPI SPRY BETADINE 3OZ

## (undated) DEVICE — URETERAL ACCESS SHEATH SET: Brand: NAVIGATOR HD

## (undated) DEVICE — GW PTFE FIX/CORE FLXTIP .038 3X150CM

## (undated) DEVICE — STERILE POLYISOPRENE POWDER-FREE SURGICAL GLOVES: Brand: PROTEXIS

## (undated) DEVICE — SOL IRR NACL 0.9PCT 3000ML

## (undated) DEVICE — CATH URETRL OPN/END 5F70CM